# Patient Record
Sex: MALE | Race: WHITE | Employment: OTHER | URBAN - METROPOLITAN AREA
[De-identification: names, ages, dates, MRNs, and addresses within clinical notes are randomized per-mention and may not be internally consistent; named-entity substitution may affect disease eponyms.]

---

## 2018-10-01 ENCOUNTER — HOSPITAL ENCOUNTER (EMERGENCY)
Facility: CLINIC | Age: 83
Discharge: HOME | End: 2018-10-01
Attending: EMERGENCY MEDICINE | Admitting: EMERGENCY MEDICINE
Payer: MEDICARE

## 2018-10-01 VITALS
SYSTOLIC BLOOD PRESSURE: 148 MMHG | TEMPERATURE: 97.9 F | DIASTOLIC BLOOD PRESSURE: 72 MMHG | WEIGHT: 182 LBS | RESPIRATION RATE: 21 BRPM | OXYGEN SATURATION: 95 %

## 2018-10-01 DIAGNOSIS — K64.8 INTERNAL BLEEDING HEMORRHOIDS: ICD-10-CM

## 2018-10-01 LAB
ANION GAP SERPL CALCULATED.3IONS-SCNC: 9 MMOL/L (ref 3–14)
BASOPHILS # BLD AUTO: 0 10E9/L (ref 0–0.2)
BASOPHILS NFR BLD AUTO: 0.4 %
BUN SERPL-MCNC: 17 MG/DL (ref 7–30)
CALCIUM SERPL-MCNC: 8.7 MG/DL (ref 8.5–10.1)
CHLORIDE SERPL-SCNC: 107 MMOL/L (ref 94–109)
CO2 SERPL-SCNC: 23 MMOL/L (ref 20–32)
CREAT SERPL-MCNC: 1.09 MG/DL (ref 0.66–1.25)
DIFFERENTIAL METHOD BLD: NORMAL
EOSINOPHIL # BLD AUTO: 0.2 10E9/L (ref 0–0.7)
EOSINOPHIL NFR BLD AUTO: 2.1 %
ERYTHROCYTE [DISTWIDTH] IN BLOOD BY AUTOMATED COUNT: 13.5 % (ref 10–15)
GFR SERPL CREATININE-BSD FRML MDRD: 64 ML/MIN/1.7M2
GLUCOSE SERPL-MCNC: 95 MG/DL (ref 70–99)
HCT VFR BLD AUTO: 43.7 % (ref 40–53)
HGB BLD-MCNC: 14.7 G/DL (ref 13.3–17.7)
IMM GRANULOCYTES # BLD: 0 10E9/L (ref 0–0.4)
IMM GRANULOCYTES NFR BLD: 0.1 %
INTERPRETATION ECG - MUSE: NORMAL
LYMPHOCYTES # BLD AUTO: 2.3 10E9/L (ref 0.8–5.3)
LYMPHOCYTES NFR BLD AUTO: 31.3 %
MCH RBC QN AUTO: 30.4 PG (ref 26.5–33)
MCHC RBC AUTO-ENTMCNC: 33.6 G/DL (ref 31.5–36.5)
MCV RBC AUTO: 91 FL (ref 78–100)
MONOCYTES # BLD AUTO: 0.6 10E9/L (ref 0–1.3)
MONOCYTES NFR BLD AUTO: 8.1 %
NEUTROPHILS # BLD AUTO: 4.2 10E9/L (ref 1.6–8.3)
NEUTROPHILS NFR BLD AUTO: 58 %
NRBC # BLD AUTO: 0 10*3/UL
NRBC BLD AUTO-RTO: 0 /100
PLATELET # BLD AUTO: 150 10E9/L (ref 150–450)
POTASSIUM SERPL-SCNC: 4 MMOL/L (ref 3.4–5.3)
RBC # BLD AUTO: 4.83 10E12/L (ref 4.4–5.9)
SODIUM SERPL-SCNC: 139 MMOL/L (ref 133–144)
WBC # BLD AUTO: 7.2 10E9/L (ref 4–11)

## 2018-10-01 PROCEDURE — 80048 BASIC METABOLIC PNL TOTAL CA: CPT | Performed by: EMERGENCY MEDICINE

## 2018-10-01 PROCEDURE — 93005 ELECTROCARDIOGRAM TRACING: CPT

## 2018-10-01 PROCEDURE — 85025 COMPLETE CBC W/AUTO DIFF WBC: CPT | Performed by: EMERGENCY MEDICINE

## 2018-10-01 PROCEDURE — 99284 EMERGENCY DEPT VISIT MOD MDM: CPT

## 2018-10-01 ASSESSMENT — ENCOUNTER SYMPTOMS
FATIGUE: 0
BLOOD IN STOOL: 1
ABDOMINAL PAIN: 0
LIGHT-HEADEDNESS: 0
CONSTIPATION: 0
ANAL BLEEDING: 1
RECTAL PAIN: 0
DIARRHEA: 0

## 2018-10-01 NOTE — DISCHARGE INSTRUCTIONS
Hemorrhoids    Hemorrhoids are swollen and inflamed veins inside the rectum and near the anus. The rectum is the last several inches of the colon. The anus is the passage between the rectum and the outside of the body.  Causes  The veins can become swollen due to increased pressure in them. This is most often caused by:    Chronic constipation or diarrhea    Straining when having a bowel movement    Sitting too long on the toilet    A low-fiber diet    Pregnancy  Symptoms    Bleeding from the rectum (this may be noticeable after bowel movements)    Lump near the anus    Itching around the anus    Pain around the anus  There are different types of hemorrhoids. Depending on the type you have and the severity, you may be able to treat yourself at home. In some cases, a procedure may be the best treatment option. Your healthcare provider can tell you more about this, if needed.  Home care  General care    To get relief from pain or itching, try:  ? Medicines. Your healthcare provider may recommend stool softeners, suppositories, or laxatives to help manage constipation. Use these exactly as directed.  ? Sitz baths. A sitz bath involves sitting in a few inches of warm bath water. Be careful not to make the water so hot that you burn yourself--test it before sitting in it. Soak for about 10 to 15 minutes a few times a day. This may help relieve pain.  ? Topical products. Your healthcare provider may prescribe or recommend creams, ointments, or pads that can be applied to the hemorrhoid. Use these exactly as directed.  Tips to help prevent hemorrhoids    Eat more fiber. Fiber adds bulk to stool and absorbs water as it moves through your colon. This makes stool softer and easier to pass.  ? Increase the fiber in your diet with more fiber-rich foods. These include fresh fruit, vegetables, and whole grains.  ? Take a fiber supplement or bulking agent, if advised by your healthcare provider. These include products such as  psyllium or methylcellulose.    Drink more water. Your healthcare provider may direct you to drink plenty of water. This can help keep stool soft.    Be more active. Frequent exercise aids digestion and helps prevent constipation. It may also help make bowel movements more regular.    Don t strain during bowel movements. This can make hemorrhoids more likely. Also, don t sit on the toilet for long periods of time.  Follow-up care  Follow up with your healthcare provider as advised. If a culture or imaging tests were done, someone will let you know the results when they are ready. This may take a few days or longer. If your healthcare provider recommends a procedure for your hemorrhoids, these options can be discussed. Options may include surgery and outpatient office treatments.  When to seek medical advice  Call your healthcare provider right away if any of these occur:    Increased bleeding from the rectum    Increased pain around the rectum or anus    Weakness or dizziness  Call 911  Call 911 if any of these occur:    Trouble breathing or swallowing    Fainting or loss of consciousness    Unusually fast heart rate    Vomiting blood    Large amounts of blood in stool or black, tarry stools  Date Last Reviewed: 9/1/2017 2000-2017 [employer]. [address] [place] [address]. All rights reserved. This information is not intended as a substitute for professional medical care. Always follow your healthcare professional's instructions.          Treating Hemorrhoids: Self-Care    Follow your healthcare provider s advice about caring for your hemorrhoids at home. Some treatments help relieve symptoms right away. Others involve making changes in your diet and exercise habits. These can help ease constipation and prevent hemorrhoid symptoms from coming back.  Relieving symptoms  Your healthcare provider may prescribe anti-inflammatory medicine to help ease your symptoms. The following tips will  also help relieve pain and swelling.    Take sitz baths. Taking a sitz bath means sitting in a few inches of warm bath water. Soaking for 10 minutes twice a day can provide welcome relief from painful hemorrhoids. It can also help the area stay clean.    Develop good bowel habits. Use the bathroom when you need to. Don t ignore the urge to move your bowels. This can lead to constipation, hard stools, and straining. Also, don t read while on the toilet. Sit only as long as needed. Wipe gently with soft, unscented toilet tissue or baby wipes.    Use ice packs. Placing an ice pack on a thrombosed external hemorrhoid can help relieve pain right away. It will also help reduce the blood clot. Use the ice for 15 to 20 minutes at a time. Keep a cloth between the ice and your skin to prevent skin damage.    Use other measures. Laxatives and enemas can help ease constipation. But use them only on your healthcare provider s advice. For symptom relief, try using cotton pads soaked in witch hazel. These are available at most drugstores. Over-the-counter hemorrhoid ointments and petroleum jelly can also provide relief.  Add fiber to your diet  Adding fiber to your diet can help relieve constipation by making stools softer and easier to pass. To increase your fiber intake, your healthcare provider may recommend a bulking agent, such as psyllium. This is a high-fiber supplement available at most grocery stores and drugstores. Eating more fiber-rich foods will also help. There are two types of fiber:    Insoluble fiber is the main ingredient in bulking agents. It s also found in foods such as wheat bran, whole-grain breads, fresh fruits, and vegetables.    Soluble fiber is found in foods such as oat bran. Although soluble fiber is good for you, it may not ease constipation as much as foods high in insoluble fiber.  Drink more water  Along with a high-fiber diet, drinking more water can help ease constipation. This is because  insoluble fiber absorbs water, making stools soft and bulky. Be sure to drink plenty of water throughout the day. Drinking fruit juices, such as prune juice or apple juice, can also help prevent constipation.  Get more exercise  Regular exercise aids digestion and helps prevent constipation. It s also great for your health. So talk with your healthcare provider about starting an exercise program. Low-impact activities, such as swimming or walking, are good places to start. Take it easy at first. And remember to drink plenty of water when you exercise.  High-fiber foods  High-fiber foods offer many benefits. By making your stools softer, they help heal and prevent swollen hemorrhoids. They may also help reduce the risk of colon and rectal cancer. Best of all, they re usually low in calories and taste great. Here are some examples of fiber-rich foods.    Whole grains, such as wheat bran, corn bran, and brown rice.    Vegetables, especially carrots, broccoli, cabbage, and peas.    Fruits, such as apples, bananas, raisins, peaches, and pears.    Nuts and legumes, especially peanuts, lentils, and kidney beans.  Easy ways to add fiber  The tips below offer some simple ways to add more high-fiber foods to your meals.    Start your day with a high-fiber breakfast. Eat a wheat bran cereal along with a sliced banana. Or, try peanut butter on whole-wheat toast.    Eat carrot sticks for snacks. They re easy to prepare, taste great, and are low in calories.    Use whole-grain breads instead of white bread for sandwiches.    Eat fruits for treats. Try an apple and some raisins instead of a candy bar.   Date Last Reviewed: 7/1/2016 2000-2017 [employer]. [address] [place] [address], [place] [address]. All rights reserved. This information is not intended as a substitute for professional medical care. Always follow your healthcare professional's instructions.          Treating Hemorrhoids: Surgery    Your healthcare  provider may recommend surgery to remove hemorrhoids that cause severe symptoms. Your healthcare provider can explain the procedure that will be used. You ll also be told how to get ready for surgery, and what to expect while you recover.  Getting ready for surgery  Your surgery will be done at a hospital or outpatient surgical center. Be sure to follow all your healthcare provider s guidelines to prepare for surgery.    Tell your healthcare provider what medicines you take. This includes blood thinners, aspirin, and ibuprofen. Also mention if you take any herbal remedies or supplements. In some cases, you may need to stop taking them a week before surgery.    Stop smoking.    Arrange for an adult family member or friend to give you a ride home after the procedure.    Stop eating and drinking before midnight, the night before your surgery.        Risks and complications  The possible risks and complications of the treatments described on these pages include:    Infection    Bleeding    Trouble urinating    Narrowing of the anal canal (very rare)  When to call your healthcare provider  After surgery, call your healthcare provider immediately if you have any of the following:    Increasing pain    Persistent bleeding    Fever or chills    Inability to move your bowels    Trouble urinating   The day of surgery  Arrive at the hospital or surgery center on time. You will be asked to sign some forms and change into a patient gown. You ll then be given an IV (intravenous line), which supplies fluids and medicine. You may also be given a laxative or enema to clean stool from your rectum. Just before surgery, you ll talk with an anesthesiologist. He or she can explain the type of medicine used to prevent pain during surgery.    Local anesthesia numbs just the surgical area.    Monitored sedation makes you relaxed and drowsy.    Regional anesthesia numbs certain areas of your body.    General anesthesia lets you sleep during  the procedure.  During surgery  Your healthcare provider will insert an anoscope to view the anal canal. Using surgical tools, the swollen hemorrhoids are then removed. In some cases, the incision is closed with sutures. In other cases, you may have a procedure that closes the incision with staples.  Hemorrhoidectomy with sutures  The hemorrhoids are removed using surgical tools, such as a scalpel or cautery (sealing) device. The incision is then closed with sutures. In some cases, the incision may be left partially open. This allows fluid to drain and helps the healing process.  Stapled hemorrhoidopexy  This procedure uses a special device to remove a ring of tissue from the anal canal. Removing the tissue cuts off blood supply to the hemorrhoids, causing them to shrink. The tissue ring is then secured with staples. This helps hold the tissue in place.  After surgery  You ll be taken to a recovery area to rest for a while. You can usually go home the same day. But in some cases you may need to remain in the hospital overnight. For a short time after surgery you may have nausea, minor bleeding, and discharge. You ll also likely have some pain. To help you feel better, your healthcare provider will prescribe pain medicine. You may also be prescribed medicines to help make bowel movements easier.  Date Last Reviewed: 7/1/2016 2000-2017 [employer]. [address] [place] [address], [place] [address]. All rights reserved. This information is not intended as a substitute for professional medical care. Always follow your healthcare professional's instructions.

## 2018-10-01 NOTE — ED AVS SNAPSHOT
[facility]    [address]    [facility] [address]    Phone:  [phone]    Fax:  [phone]                                       Volodymyr Sanchez   MRN: [id number]    Department:  [facility]   Date of Visit:  10/1/2018           After Visit Summary Signature Page     I have received my discharge instructions, and my questions have been answered. I have discussed any challenges I see with this plan with the nurse or doctor.    ..........................................................................................................................................  Patient/Patient Representative Signature      ..........................................................................................................................................  Patient Representative Print Name and Relationship to Patient    ..................................................               ................................................  Date                                   Time    ..........................................................................................................................................  Reviewed by Signature/Title    ...................................................              ..............................................  Date                                               Time          22EPIC Rev 08/18

## 2018-10-01 NOTE — ED AVS SNAPSHOT
[facility]    [address] [place] [address]    Phone:  [phone]    Fax:  [phone]                                       Volodymyr Sanchez   MRN: [id number]    Department:  [facility]   Date of Visit:  10/1/2018           Patient Information     Date Of Birth          11/13/1931        Your diagnoses for this visit were:     Internal bleeding hemorrhoids        You were seen by Myles Osborn MD.      Follow-up Information     Follow up with Colorectal surgery.    Why:  for re-evaluation of your symptoms        Discharge Instructions         Hemorrhoids    Hemorrhoids are swollen and inflamed veins inside the rectum and near the anus. The rectum is the last several inches of the colon. The anus is the passage between the rectum and the outside of the body.  Causes  The veins can become swollen due to increased pressure in them. This is most often caused by:    Chronic constipation or diarrhea    Straining when having a bowel movement    Sitting too long on the toilet    A low-fiber diet    Pregnancy  Symptoms    Bleeding from the rectum (this may be noticeable after bowel movements)    Lump near the anus    Itching around the anus    Pain around the anus  There are different types of hemorrhoids. Depending on the type you have and the severity, you may be able to treat yourself at home. In some cases, a procedure may be the best treatment option. Your healthcare provider can tell you more about this, if needed.  Home care  General care    To get relief from pain or itching, try:  ? Medicines. Your healthcare provider may recommend stool softeners, suppositories, or laxatives to help manage constipation. Use these exactly as directed.  ? Sitz baths. A sitz bath involves sitting in a few inches of warm bath water. Be careful not to make the water so hot that you burn yourself--test it before sitting in it. Soak for  about 10 to 15 minutes a few times a day. This may help relieve pain.  ? Topical products. Your healthcare provider may prescribe or recommend creams, ointments, or pads that can be applied to the hemorrhoid. Use these exactly as directed.  Tips to help prevent hemorrhoids    Eat more fiber. Fiber adds bulk to stool and absorbs water as it moves through your colon. This makes stool softer and easier to pass.  ? Increase the fiber in your diet with more fiber-rich foods. These include fresh fruit, vegetables, and whole grains.  ? Take a fiber supplement or bulking agent, if advised by your healthcare provider. These include products such as psyllium or methylcellulose.    Drink more water. Your healthcare provider may direct you to drink plenty of water. This can help keep stool soft.    Be more active. Frequent exercise aids digestion and helps prevent constipation. It may also help make bowel movements more regular.    Don t strain during bowel movements. This can make hemorrhoids more likely. Also, don t sit on the toilet for long periods of time.  Follow-up care  Follow up with your healthcare provider as advised. If a culture or imaging tests were done, someone will let you know the results when they are ready. This may take a few days or longer. If your healthcare provider recommends a procedure for your hemorrhoids, these options can be discussed. Options may include surgery and outpatient office treatments.  When to seek medical advice  Call your healthcare provider right away if any of these occur:    Increased bleeding from the rectum    Increased pain around the rectum or anus    Weakness or dizziness  Call 911  Call 911 if any of these occur:    Trouble breathing or swallowing    Fainting or loss of consciousness    Unusually fast heart rate    Vomiting blood    Large amounts of blood in stool or black, tarry stools  Date Last Reviewed: 9/1/2017 2000-2017 [employer]. [address], [place] [address]. All rights reserved. This information is not intended as a substitute for professional medical care. Always follow your healthcare professional's instructions.          Treating Hemorrhoids: Self-Care    Follow your healthcare provider s advice about caring for your hemorrhoids at home. Some treatments help relieve symptoms right away. Others involve making changes in your diet and exercise habits. These can help ease constipation and prevent hemorrhoid symptoms from coming back.  Relieving symptoms  Your healthcare provider may prescribe anti-inflammatory medicine to help ease your symptoms. The following tips will also help relieve pain and swelling.    Take sitz baths. Taking a sitz bath means sitting in a few inches of warm bath water. Soaking for 10 minutes twice a day can provide welcome relief from painful hemorrhoids. It can also help the area stay clean.    Develop good bowel habits. Use the bathroom when you need to. Don t ignore the urge to move your bowels. This can lead to constipation, hard stools, and straining. Also, don t read while on the toilet. Sit only as long as needed. Wipe gently with soft, unscented toilet tissue or baby wipes.    Use ice packs. Placing an ice pack on a thrombosed external hemorrhoid can help relieve pain right away. It will also help reduce the blood clot. Use the ice for 15 to 20 minutes at a time. Keep a cloth between the ice and your skin to prevent skin damage.    Use other measures. Laxatives and enemas can help ease constipation. But use them only on your healthcare provider s advice. For symptom relief, try using cotton pads soaked in witch hazel. These are available at most drugstores. Over-the-counter hemorrhoid ointments and petroleum jelly can also provide relief.  Add fiber to your diet  Adding fiber to your diet can help relieve constipation by making stools softer and easier to pass. To increase your fiber intake, your healthcare  provider may recommend a bulking agent, such as psyllium. This is a high-fiber supplement available at most grocery stores and drugstores. Eating more fiber-rich foods will also help. There are two types of fiber:    Insoluble fiber is the main ingredient in bulking agents. It s also found in foods such as wheat bran, whole-grain breads, fresh fruits, and vegetables.    Soluble fiber is found in foods such as oat bran. Although soluble fiber is good for you, it may not ease constipation as much as foods high in insoluble fiber.  Drink more water  Along with a high-fiber diet, drinking more water can help ease constipation. This is because insoluble fiber absorbs water, making stools soft and bulky. Be sure to drink plenty of water throughout the day. Drinking fruit juices, such as prune juice or apple juice, can also help prevent constipation.  Get more exercise  Regular exercise aids digestion and helps prevent constipation. It s also great for your health. So talk with your healthcare provider about starting an exercise program. Low-impact activities, such as swimming or walking, are good places to start. Take it easy at first. And remember to drink plenty of water when you exercise.  High-fiber foods  High-fiber foods offer many benefits. By making your stools softer, they help heal and prevent swollen hemorrhoids. They may also help reduce the risk of colon and rectal cancer. Best of all, they re usually low in calories and taste great. Here are some examples of fiber-rich foods.    Whole grains, such as wheat bran, corn bran, and brown rice.    Vegetables, especially carrots, broccoli, cabbage, and peas.    Fruits, such as apples, bananas, raisins, peaches, and pears.    Nuts and legumes, especially peanuts, lentils, and kidney beans.  Easy ways to add fiber  The tips below offer some simple ways to add more high-fiber foods to your meals.    Start your day with a high-fiber breakfast. Eat a wheat bran cereal  along with a sliced banana. Or, try peanut butter on whole-wheat toast.    Eat carrot sticks for snacks. They re easy to prepare, taste great, and are low in calories.    Use whole-grain breads instead of white bread for sandwiches.    Eat fruits for treats. Try an apple and some raisins instead of a candy bar.   Date Last Reviewed: 7/1/2016 2000-2017 [employer]. [address] [place] [address], [place] [address]. All rights reserved. This information is not intended as a substitute for professional medical care. Always follow your healthcare professional's instructions.          Treating Hemorrhoids: Surgery    Your healthcare provider may recommend surgery to remove hemorrhoids that cause severe symptoms. Your healthcare provider can explain the procedure that will be used. You ll also be told how to get ready for surgery, and what to expect while you recover.  Getting ready for surgery  Your surgery will be done at a hospital or outpatient surgical center. Be sure to follow all your healthcare provider s guidelines to prepare for surgery.    Tell your healthcare provider what medicines you take. This includes blood thinners, aspirin, and ibuprofen. Also mention if you take any herbal remedies or supplements. In some cases, you may need to stop taking them a week before surgery.    Stop smoking.    Arrange for an adult family member or friend to give you a ride home after the procedure.    Stop eating and drinking before midnight, the night before your surgery.        Risks and complications  The possible risks and complications of the treatments described on these pages include:    Infection    Bleeding    Trouble urinating    Narrowing of the anal canal (very rare)  When to call your healthcare provider  After surgery, call your healthcare provider immediately if you have any of the following:    Increasing pain    Persistent bleeding    Fever or chills    Inability to move your bowels    Trouble  urinating   The day of surgery  Arrive at the hospital or surgery center on time. You will be asked to sign some forms and change into a patient gown. You ll then be given an IV (intravenous line), which supplies fluids and medicine. You may also be given a laxative or enema to clean stool from your rectum. Just before surgery, you ll talk with an anesthesiologist. He or she can explain the type of medicine used to prevent pain during surgery.    Local anesthesia numbs just the surgical area.    Monitored sedation makes you relaxed and drowsy.    Regional anesthesia numbs certain areas of your body.    General anesthesia lets you sleep during the procedure.  During surgery  Your healthcare provider will insert an anoscope to view the anal canal. Using surgical tools, the swollen hemorrhoids are then removed. In some cases, the incision is closed with sutures. In other cases, you may have a procedure that closes the incision with staples.  Hemorrhoidectomy with sutures  The hemorrhoids are removed using surgical tools, such as a scalpel or cautery (sealing) device. The incision is then closed with sutures. In some cases, the incision may be left partially open. This allows fluid to drain and helps the healing process.  Stapled hemorrhoidopexy  This procedure uses a special device to remove a ring of tissue from the anal canal. Removing the tissue cuts off blood supply to the hemorrhoids, causing them to shrink. The tissue ring is then secured with staples. This helps hold the tissue in place.  After surgery  You ll be taken to a recovery area to rest for a while. You can usually go home the same day. But in some cases you may need to remain in the hospital overnight. For a short time after surgery you may have nausea, minor bleeding, and discharge. You ll also likely have some pain. To help you feel better, your healthcare provider will prescribe pain medicine. You may also be prescribed medicines to help make bowel  movements easier.  Date Last Reviewed: 7/1/2016 2000-2017 [employer]. [address] [place] [address], [place] [address]. All rights reserved. This information is not intended as a substitute for professional medical care. Always follow your healthcare professional's instructions.          24 Hour Appointment Hotline       To make an appointment at any [facility], call [phone] ([phone]). If you don't have a family doctor or clinic, we will help you find one. [facility] are conveniently located to serve the needs of you and your family.             Review of your medicines      Our records show that you are taking the medicines listed below. If these are incorrect, please call your family doctor or clinic.        Dose / Directions Last dose taken    LOVASTATIN PO        Refills:  0                Procedures and tests performed during your visit     Basic metabolic panel    CBC with platelets differential    EKG 12 lead    Peripheral IV: Standard      Orders Needing Specimen Collection     None      Pending Results     Date and Time Order Name Status Description    10/1/2018 0737 EKG 12 lead Preliminary             Pending Culture Results     No orders found from 9/29/2018 to 10/2/2018.            Pending Results Instructions     If you had any lab results that were not finalized at the time of your Discharge, you can call the ED Lab Result RN at [phone]. You will be contacted by this team for any positive Lab results or changes in treatment. The nurses are available 7 days a week from 10A to 6:30P.  You can leave a message 24 hours per day and they will return your call.        Test Results From Your Hospital Stay        10/1/2018  7:48 AM      Component Results     Component Value Ref Range & Units Status    WBC 7.2 4.0 - 11.0 10e9/L Final    RBC Count 4.83 4.4 - 5.9 10e12/L Final    Hemoglobin 14.7 13.3 - 17.7 g/dL Final    Hematocrit 43.7 40.0 - 53.0 % Final    MCV 91 78 -  100 fl Final    MCH 30.4 26.5 - 33.0 pg Final    MCHC 33.6 31.5 - 36.5 g/dL Final    RDW 13.5 10.0 - 15.0 % Final    Platelet Count 150 150 - 450 10e9/L Final    Diff Method Automated Method  Final    % Neutrophils 58.0 % Final    % Lymphocytes 31.3 % Final    % Monocytes 8.1 % Final    % Eosinophils 2.1 % Final    % Basophils 0.4 % Final    % Immature Granulocytes 0.1 % Final    Nucleated RBCs 0 0 /100 Final    Absolute Neutrophil 4.2 1.6 - 8.3 10e9/L Final    Absolute Lymphocytes 2.3 0.8 - 5.3 10e9/L Final    Absolute Monocytes 0.6 0.0 - 1.3 10e9/L Final    Absolute Eosinophils 0.2 0.0 - 0.7 10e9/L Final    Absolute Basophils 0.0 0.0 - 0.2 10e9/L Final    Abs Immature Granulocytes 0.0 0 - 0.4 10e9/L Final    Absolute Nucleated RBC 0.0  Final         10/1/2018  7:56 AM      Component Results     Component Value Ref Range & Units Status    Sodium 139 133 - 144 mmol/L Final    Potassium 4.0 3.4 - 5.3 mmol/L Final    Chloride 107 94 - 109 mmol/L Final    Carbon Dioxide 23 20 - 32 mmol/L Final    Anion Gap 9 3 - 14 mmol/L Final    Glucose 95 70 - 99 mg/dL Final    Urea Nitrogen 17 7 - 30 mg/dL Final    Creatinine 1.09 0.66 - 1.25 mg/dL Final    GFR Estimate 64 >60 mL/min/1.7m2 Final    Non  GFR Calc    GFR Estimate If Black 77 >60 mL/min/1.7m2 Final    African American GFR Calc    Calcium 8.7 8.5 - 10.1 mg/dL Final                Clinical Quality Measure: Blood Pressure Screening     Your blood pressure was checked while you were in the emergency department today. The last reading we obtained was  BP: 148/72 . Please read the guidelines below about what these numbers mean and what you should do about them.  If your systolic blood pressure (the top number) is less than 120 and your diastolic blood pressure (the bottom number) is less than 80, then your blood pressure is normal. There is nothing more that you need to do about it.  If your systolic blood pressure (the top number) is 120-139 or your  "diastolic blood pressure (the bottom number) is 80-89, your blood pressure may be higher than it should be. You should have your blood pressure rechecked within a year by a primary care provider.  If your systolic blood pressure (the top number) is 140 or greater or your diastolic blood pressure (the bottom number) is 90 or greater, you may have high blood pressure. High blood pressure is treatable, but if left untreated over time it can put you at risk for heart attack, stroke, or kidney failure. You should have your blood pressure rechecked by a primary care provider within the next 4 weeks.  If your provider in the emergency department today gave you specific instructions to follow-up with your doctor or provider even sooner than that, you should follow that instruction and not wait for up to 4 weeks for your follow-up visit.        Thank you for choosing [facility]       Thank you for choosing [facility] for your care. Our goal is always to provide you with excellent care. Hearing back from our patients is one way we can continue to improve our services. Please take a few minutes to complete the written survey that you may receive in the mail after you visit with us. Thank you!        [employer] Information     [facility] lets you send messages to your doctor, view your test results, renew your prescriptions, schedule appointments and more. To sign up, go to [email or web address] . Click on \"Log in\" on the left side of the screen, which will take you to the Welcome page. Then click on \"Sign up Now\" on the right side of the page.     You will be asked to enter the access code listed below, as well as some personal information. Please follow the directions to create your username and password.     Your access code is: [email or web address]  Expires: 2018  9:14 AM     Your access code will  in 90 days. If you need help or a new code, please call your [facility] clinic or [phone].        Care EveryWhere ID     This is " your Care EveryWhere ID. This could be used by other organizations to access your [facility] medical records  [id number]        Equal Access to Services     EDDIE CONTRERAS : Hadii flakito Cross, carline aguila, sharon vivar, gaurav barfield. So [facility] [phone].    ATENCIÓN: Si habla español, tiene a robledo disposición servicios gratuitos de asistencia lingüística. Llame al [phone].    We comply with applicable federal civil rights laws and [place] laws. We do not discriminate on the basis of race, color, national origin, age, disability, sex, sexual orientation, or gender identity.            After Visit Summary       This is your record. Keep this with you and show to your community pharmacist(s) and doctor(s) at your next visit.

## 2018-10-01 NOTE — ED TRIAGE NOTES
Patient reports he has issues with constipation and has hemorrhoids. Patient states he is having rectal bleeding from them which started yesterday. Is using a pad to catch the blood.

## 2018-10-01 NOTE — ED PROVIDER NOTES
History     Chief Complaint:  Rectal bleeding    HPI   Volodymyr Sanchez is a 86 year old male who presents to the emergency department today with rectal bleeding. The patient describes bright red, heavy bleeding from his hemorrhoids intermittently. The bleeding started yesterday, but he is worried that he lost a lot of blood today. He reports that usually he puts on a pad and it resolves after a few days, but today felt more serious. He denies use of anticoagulants or aspirin. He reports recent chronic constipation. He denies other abdominal pain, dizziness. Patient had a colonoscopy 9 years ago, which was not significant at that point.     Patient's PCP is Henrique Lockhart at [facility].     Allergies:  No Known Drug Allergies     Medications:    Lovastatin     Past Medical History:    Hemorrhoids  Hypercholesteremia     Past Surgical History:    History reviewed. No pertinent past surgical history.    Family History:    History reviewed. No pertinent family history.     Social History:  The patient was alone.  Smoking Status: Never  Smokeless Tobacco: Never  Alcohol Use: No    Marital Status:       Review of Systems   Constitutional: Negative for fatigue.   Gastrointestinal: Positive for anal bleeding and blood in stool. Negative for abdominal pain, constipation, diarrhea and rectal pain.   Neurological: Negative for light-headedness.   All other systems reviewed and are negative.    Physical Exam     Patient Vitals for the past 24 hrs:   BP Temp Temp src Heart Rate Resp SpO2 Weight   10/01/18 0830 - - - 68 21 95 % -   10/01/18 0718 148/72 97.9  F (36.6  C) Oral 86 16 96 % 82.6 kg (182 lb)      Physical Exam  General: Patient is alert and cooperative.  HENT:  Normal nose, oropharynx. Moist oral mucosa.  Eyes: EOMI. Normal conjunctiva.  Neck:  Normal range of motion and appearance.   Cardiovascular:  Normal rate.   Pulmonary/Chest:  Effort normal.  Abdominal: Soft. No distension or tenderness.   Prolapsed, reducible, non bleeding internal hemorrhoid.   Musculoskeletal: Normal range of motion.  Neurological: oriented, normal strength, sensation, and coordination.   Skin: Warm and dry. No rash or bruising.   Psychiatric: Normal mood and affect. Normal behavior and judgement.    Emergency Department Course   ECG:  Indication: Bleeding  Completed at 0714.  Read at 0715.   Normal sinus rhythm  Normal ECG    Rate 89 bpm. MI interval 162. QRS duration 96. QT/QTc 354/430. P-R-T axes 24 -28 28.     Laboratory:  Laboratory findings were communicated with the patient who voiced understanding of the findings.  CBC: AWNL (WBC 7.2, HGB 14/7, )  BMP: AWNL (Creatinine 1.09)     Emergency Department Course:  Nursing notes and vitals reviewed.  0708: I performed an exam of the patient as documented above.   IV was inserted and blood was drawn for laboratory testing, results above.  0902:Findings and plan explained to the Patient. Patient discharged home with instructions regarding supportive care, medications, and reasons to return. The importance of close follow-up was reviewed.   I personally reviewed the laboratory results with the Patient and answered all related questions prior to discharge.   Impression & Plan    Medical Decision Making:  This is an 86-year-old male who was presented with rectal bleeding secondary to a prolapsed, reducible internal hemorrhoid.  He is hemodynamically stable with no associated abdominal pain or rectal discomfort.  He is not anticoagulated and in fact does not even take a daily aspirin.  Laboratory tests include a reassuringly normal CBC.  His physical examination was notable for a grade 3 internal hemorrhoid with evidence of recent but not current bleeding.  There is no indication for imaging.  I have recommended follow-up with colorectal surgery for definitive management.  This has in fact been recommended and arranged in the past but he canceled appointment due to seeming  resolution of his problem.  He intends to contact his primary care provider for assistance with arranging follow-up with colorectal and is comfortable going home.  He can return at any point in time if his bleeding worsens or he develops any associated symptoms of concern such as weakness or lightheadedness.    Diagnosis:    ICD-10-CM    1. Internal bleeding hemorrhoids K64.8        Disposition:  discharged to home    Scribe Disclosure:  I, Trini Mchugh, am serving as a scribe at 7:08 AM on 10/1/2018 to document services personally performed by Myles Osborn MD based on my observations and the provider's statements to me.    10/1/2018   [facility] EMERGENCY DEPARTMENT       Myles Osborn MD  10/01/18 1027

## 2019-04-04 ENCOUNTER — HOSPITAL ENCOUNTER (INPATIENT)
Facility: CLINIC | Age: 84
LOS: 1 days | Discharge: HOME | DRG: 101 | End: 2019-04-05
Attending: EMERGENCY MEDICINE | Admitting: INTERNAL MEDICINE
Payer: COMMERCIAL

## 2019-04-04 ENCOUNTER — APPOINTMENT (OUTPATIENT)
Dept: CT IMAGING | Facility: CLINIC | Age: 84
DRG: 101 | End: 2019-04-04
Attending: EMERGENCY MEDICINE
Payer: COMMERCIAL

## 2019-04-04 ENCOUNTER — APPOINTMENT (OUTPATIENT)
Dept: SPEECH THERAPY | Facility: CLINIC | Age: 84
DRG: 101 | End: 2019-04-04
Attending: INTERNAL MEDICINE
Payer: COMMERCIAL

## 2019-04-04 ENCOUNTER — APPOINTMENT (OUTPATIENT)
Dept: MRI IMAGING | Facility: CLINIC | Age: 84
DRG: 101 | End: 2019-04-04
Attending: INTERNAL MEDICINE
Payer: COMMERCIAL

## 2019-04-04 ENCOUNTER — APPOINTMENT (OUTPATIENT)
Dept: GENERAL RADIOLOGY | Facility: CLINIC | Age: 84
DRG: 101 | End: 2019-04-04
Attending: INTERNAL MEDICINE
Payer: COMMERCIAL

## 2019-04-04 DIAGNOSIS — R56.9 SEIZURES (H): ICD-10-CM

## 2019-04-04 DIAGNOSIS — K92.2 UGIB (UPPER GASTROINTESTINAL BLEED): ICD-10-CM

## 2019-04-04 LAB
ABO + RH BLD: NORMAL
ABO + RH BLD: NORMAL
ALBUMIN SERPL-MCNC: 3.5 G/DL (ref 3.4–5)
ALBUMIN UR-MCNC: 20 MG/DL
ALP SERPL-CCNC: 66 U/L (ref 40–150)
ALT SERPL W P-5'-P-CCNC: 19 U/L (ref 0–70)
AMORPH CRY #/AREA URNS HPF: ABNORMAL /HPF
ANION GAP SERPL CALCULATED.3IONS-SCNC: 4 MMOL/L (ref 3–14)
APPEARANCE UR: CLEAR
AST SERPL W P-5'-P-CCNC: 42 U/L (ref 0–45)
BACTERIA #/AREA URNS HPF: ABNORMAL /HPF
BASOPHILS # BLD AUTO: 0 10E9/L (ref 0–0.2)
BASOPHILS NFR BLD AUTO: 0.2 %
BILIRUB SERPL-MCNC: 0.7 MG/DL (ref 0.2–1.3)
BILIRUB UR QL STRIP: NEGATIVE
BLD GP AB SCN SERPL QL: NORMAL
BLOOD BANK CMNT PATIENT-IMP: NORMAL
BUN SERPL-MCNC: 17 MG/DL (ref 7–30)
CALCIUM SERPL-MCNC: 8.5 MG/DL (ref 8.5–10.1)
CHLORIDE SERPL-SCNC: 108 MMOL/L (ref 94–109)
CO2 SERPL-SCNC: 26 MMOL/L (ref 20–32)
COLOR UR AUTO: YELLOW
CREAT SERPL-MCNC: 0.95 MG/DL (ref 0.66–1.25)
DIFFERENTIAL METHOD BLD: ABNORMAL
EOSINOPHIL # BLD AUTO: 0 10E9/L (ref 0–0.7)
EOSINOPHIL NFR BLD AUTO: 0.1 %
ERYTHROCYTE [DISTWIDTH] IN BLOOD BY AUTOMATED COUNT: 13.6 % (ref 10–15)
ETHANOL SERPL-MCNC: <0.01 G/DL
GASTROCULT GAST QL: POSITIVE
GFR SERPL CREATININE-BSD FRML MDRD: 72 ML/MIN/{1.73_M2}
GLUCOSE SERPL-MCNC: 135 MG/DL (ref 70–99)
GLUCOSE UR STRIP-MCNC: 70 MG/DL
HCT VFR BLD AUTO: 40.3 % (ref 40–53)
HGB BLD-MCNC: 13.1 G/DL (ref 13.3–17.7)
HGB UR QL STRIP: NEGATIVE
IMM GRANULOCYTES # BLD: 0.1 10E9/L (ref 0–0.4)
IMM GRANULOCYTES NFR BLD: 0.5 %
INTERPRETATION ECG - MUSE: NORMAL
KETONES UR STRIP-MCNC: ABNORMAL MG/DL
LEUKOCYTE ESTERASE UR QL STRIP: NEGATIVE
LYMPHOCYTES # BLD AUTO: 0.7 10E9/L (ref 0.8–5.3)
LYMPHOCYTES NFR BLD AUTO: 6.1 %
MCH RBC QN AUTO: 29.8 PG (ref 26.5–33)
MCHC RBC AUTO-ENTMCNC: 32.5 G/DL (ref 31.5–36.5)
MCV RBC AUTO: 92 FL (ref 78–100)
MONOCYTES # BLD AUTO: 0.6 10E9/L (ref 0–1.3)
MONOCYTES NFR BLD AUTO: 5.3 %
MUCOUS THREADS #/AREA URNS LPF: PRESENT /LPF
NEUTROPHILS # BLD AUTO: 10.2 10E9/L (ref 1.6–8.3)
NEUTROPHILS NFR BLD AUTO: 87.8 %
NITRATE UR QL: NEGATIVE
NRBC # BLD AUTO: 0 10*3/UL
NRBC BLD AUTO-RTO: 0 /100
PH GAST: 4 PH
PH UR STRIP: 5.5 PH (ref 5–7)
PLATELET # BLD AUTO: 144 10E9/L (ref 150–450)
POTASSIUM SERPL-SCNC: 4.1 MMOL/L (ref 3.4–5.3)
PROCALCITONIN SERPL-MCNC: <0.05 NG/ML
PROT SERPL-MCNC: 6.6 G/DL (ref 6.8–8.8)
RBC # BLD AUTO: 4.39 10E12/L (ref 4.4–5.9)
RBC #/AREA URNS AUTO: 2 /HPF (ref 0–2)
SODIUM SERPL-SCNC: 138 MMOL/L (ref 133–144)
SOURCE: ABNORMAL
SP GR UR STRIP: 1.03 (ref 1–1.03)
SPECIMEN EXP DATE BLD: NORMAL
SQUAMOUS #/AREA URNS AUTO: <1 /HPF (ref 0–1)
TROPONIN I SERPL-MCNC: 0.02 UG/L (ref 0–0.04)
UROBILINOGEN UR STRIP-MCNC: NORMAL MG/DL (ref 0–2)
WBC # BLD AUTO: 11.6 10E9/L (ref 4–11)
WBC #/AREA URNS AUTO: 1 /HPF (ref 0–5)

## 2019-04-04 PROCEDURE — 85025 COMPLETE CBC W/AUTO DIFF WBC: CPT | Performed by: EMERGENCY MEDICINE

## 2019-04-04 PROCEDURE — 25000132 ZZH RX MED GY IP 250 OP 250 PS 637: Performed by: INTERNAL MEDICINE

## 2019-04-04 PROCEDURE — 86900 BLOOD TYPING SEROLOGIC ABO: CPT | Performed by: EMERGENCY MEDICINE

## 2019-04-04 PROCEDURE — 95816 EEG AWAKE AND DROWSY: CPT

## 2019-04-04 PROCEDURE — 84484 ASSAY OF TROPONIN QUANT: CPT | Performed by: EMERGENCY MEDICINE

## 2019-04-04 PROCEDURE — 86850 RBC ANTIBODY SCREEN: CPT | Performed by: EMERGENCY MEDICINE

## 2019-04-04 PROCEDURE — 81001 URINALYSIS AUTO W/SCOPE: CPT | Performed by: EMERGENCY MEDICINE

## 2019-04-04 PROCEDURE — 25000128 H RX IP 250 OP 636: Performed by: EMERGENCY MEDICINE

## 2019-04-04 PROCEDURE — 25000128 H RX IP 250 OP 636: Performed by: INTERNAL MEDICINE

## 2019-04-04 PROCEDURE — 25500064 ZZH RX 255 OP 636: Performed by: INTERNAL MEDICINE

## 2019-04-04 PROCEDURE — 99285 EMERGENCY DEPT VISIT HI MDM: CPT | Mod: 25

## 2019-04-04 PROCEDURE — 80320 DRUG SCREEN QUANTALCOHOLS: CPT | Performed by: EMERGENCY MEDICINE

## 2019-04-04 PROCEDURE — 70450 CT HEAD/BRAIN W/O DYE: CPT

## 2019-04-04 PROCEDURE — 71045 X-RAY EXAM CHEST 1 VIEW: CPT

## 2019-04-04 PROCEDURE — 36415 COLL VENOUS BLD VENIPUNCTURE: CPT | Performed by: INTERNAL MEDICINE

## 2019-04-04 PROCEDURE — 86901 BLOOD TYPING SEROLOGIC RH(D): CPT | Performed by: EMERGENCY MEDICINE

## 2019-04-04 PROCEDURE — 96361 HYDRATE IV INFUSION ADD-ON: CPT

## 2019-04-04 PROCEDURE — C9113 INJ PANTOPRAZOLE SODIUM, VIA: HCPCS | Performed by: EMERGENCY MEDICINE

## 2019-04-04 PROCEDURE — 96374 THER/PROPH/DIAG INJ IV PUSH: CPT

## 2019-04-04 PROCEDURE — 12000000 ZZH R&B MED SURG/OB

## 2019-04-04 PROCEDURE — 82271 OCCULT BLOOD OTHER SOURCES: CPT | Performed by: EMERGENCY MEDICINE

## 2019-04-04 PROCEDURE — 80053 COMPREHEN METABOLIC PANEL: CPT | Performed by: EMERGENCY MEDICINE

## 2019-04-04 PROCEDURE — 36415 COLL VENOUS BLD VENIPUNCTURE: CPT | Performed by: EMERGENCY MEDICINE

## 2019-04-04 PROCEDURE — 84145 PROCALCITONIN (PCT): CPT | Performed by: INTERNAL MEDICINE

## 2019-04-04 PROCEDURE — 93005 ELECTROCARDIOGRAM TRACING: CPT

## 2019-04-04 PROCEDURE — 99223 1ST HOSP IP/OBS HIGH 75: CPT | Mod: AI | Performed by: INTERNAL MEDICINE

## 2019-04-04 PROCEDURE — 25800030 ZZH RX IP 258 OP 636: Performed by: INTERNAL MEDICINE

## 2019-04-04 PROCEDURE — A9585 GADOBUTROL INJECTION: HCPCS | Performed by: INTERNAL MEDICINE

## 2019-04-04 PROCEDURE — 92610 EVALUATE SWALLOWING FUNCTION: CPT | Mod: GN

## 2019-04-04 PROCEDURE — 70553 MRI BRAIN STEM W/O & W/DYE: CPT

## 2019-04-04 RX ORDER — SIMVASTATIN 40 MG
40 TABLET ORAL AT BEDTIME
Status: DISCONTINUED | OUTPATIENT
Start: 2019-04-04 | End: 2019-04-05 | Stop reason: HOSPADM

## 2019-04-04 RX ORDER — ACETAMINOPHEN 325 MG/1
650 TABLET ORAL EVERY 4 HOURS PRN
Status: DISCONTINUED | OUTPATIENT
Start: 2019-04-04 | End: 2019-04-05 | Stop reason: HOSPADM

## 2019-04-04 RX ORDER — LEVETIRACETAM 10 MG/ML
1000 INJECTION INTRAVASCULAR EVERY 12 HOURS
Status: DISCONTINUED | OUTPATIENT
Start: 2019-04-04 | End: 2019-04-05

## 2019-04-04 RX ORDER — ONDANSETRON 4 MG/1
4 TABLET, ORALLY DISINTEGRATING ORAL EVERY 6 HOURS PRN
Status: DISCONTINUED | OUTPATIENT
Start: 2019-04-04 | End: 2019-04-05 | Stop reason: HOSPADM

## 2019-04-04 RX ORDER — GADOBUTROL 604.72 MG/ML
10 INJECTION INTRAVENOUS ONCE
Status: COMPLETED | OUTPATIENT
Start: 2019-04-04 | End: 2019-04-04

## 2019-04-04 RX ORDER — SODIUM CHLORIDE, SODIUM LACTATE, POTASSIUM CHLORIDE, CALCIUM CHLORIDE 600; 310; 30; 20 MG/100ML; MG/100ML; MG/100ML; MG/100ML
INJECTION, SOLUTION INTRAVENOUS CONTINUOUS
Status: DISCONTINUED | OUTPATIENT
Start: 2019-04-04 | End: 2019-04-05

## 2019-04-04 RX ORDER — LOVASTATIN 40 MG
80 TABLET ORAL AT BEDTIME
COMMUNITY

## 2019-04-04 RX ORDER — LORAZEPAM 2 MG/ML
2 INJECTION INTRAMUSCULAR
Status: DISCONTINUED | OUTPATIENT
Start: 2019-04-04 | End: 2019-04-05 | Stop reason: HOSPADM

## 2019-04-04 RX ORDER — NALOXONE HYDROCHLORIDE 0.4 MG/ML
.1-.4 INJECTION, SOLUTION INTRAMUSCULAR; INTRAVENOUS; SUBCUTANEOUS
Status: DISCONTINUED | OUTPATIENT
Start: 2019-04-04 | End: 2019-04-05 | Stop reason: HOSPADM

## 2019-04-04 RX ORDER — ONDANSETRON 2 MG/ML
4 INJECTION INTRAMUSCULAR; INTRAVENOUS EVERY 6 HOURS PRN
Status: DISCONTINUED | OUTPATIENT
Start: 2019-04-04 | End: 2019-04-05 | Stop reason: HOSPADM

## 2019-04-04 RX ORDER — ACETAMINOPHEN 650 MG/1
650 SUPPOSITORY RECTAL EVERY 4 HOURS PRN
Status: DISCONTINUED | OUTPATIENT
Start: 2019-04-04 | End: 2019-04-05 | Stop reason: HOSPADM

## 2019-04-04 RX ORDER — LOVASTATIN 20 MG
40 TABLET ORAL AT BEDTIME
Status: DISCONTINUED | OUTPATIENT
Start: 2019-04-04 | End: 2019-04-04 | Stop reason: CLARIF

## 2019-04-04 RX ORDER — ASPIRIN 81 MG/1
81 TABLET ORAL DAILY
Status: DISCONTINUED | OUTPATIENT
Start: 2019-04-04 | End: 2019-04-05 | Stop reason: HOSPADM

## 2019-04-04 RX ADMIN — LEVETIRACETAM 1000 MG: 10 INJECTION INTRAVENOUS at 20:45

## 2019-04-04 RX ADMIN — PANTOPRAZOLE SODIUM 40 MG: 40 INJECTION, POWDER, FOR SOLUTION INTRAVENOUS at 03:34

## 2019-04-04 RX ADMIN — SODIUM CHLORIDE 1000 ML: 9 INJECTION, SOLUTION INTRAVENOUS at 01:43

## 2019-04-04 RX ADMIN — ASPIRIN 81 MG: 81 TABLET, COATED ORAL at 08:33

## 2019-04-04 RX ADMIN — SIMVASTATIN 40 MG: 40 TABLET, FILM COATED ORAL at 20:46

## 2019-04-04 RX ADMIN — SODIUM CHLORIDE, POTASSIUM CHLORIDE, SODIUM LACTATE AND CALCIUM CHLORIDE: 600; 310; 30; 20 INJECTION, SOLUTION INTRAVENOUS at 18:31

## 2019-04-04 RX ADMIN — LEVETIRACETAM 1000 MG: 10 INJECTION INTRAVENOUS at 08:26

## 2019-04-04 RX ADMIN — SODIUM CHLORIDE, POTASSIUM CHLORIDE, SODIUM LACTATE AND CALCIUM CHLORIDE 500 ML: 600; 310; 30; 20 INJECTION, SOLUTION INTRAVENOUS at 16:13

## 2019-04-04 RX ADMIN — SODIUM CHLORIDE, POTASSIUM CHLORIDE, SODIUM LACTATE AND CALCIUM CHLORIDE: 600; 310; 30; 20 INJECTION, SOLUTION INTRAVENOUS at 08:24

## 2019-04-04 RX ADMIN — GADOBUTROL 8 ML: 604.72 INJECTION INTRAVENOUS at 12:10

## 2019-04-04 ASSESSMENT — ACTIVITIES OF DAILY LIVING (ADL)
ADLS_ACUITY_SCORE: 19
ADLS_ACUITY_SCORE: 15
ADLS_ACUITY_SCORE: 15
ADLS_ACUITY_SCORE: 19

## 2019-04-04 ASSESSMENT — ENCOUNTER SYMPTOMS
DIZZINESS: 1
NAUSEA: 1
SEIZURES: 1
VOMITING: 1
BLOOD IN STOOL: 0

## 2019-04-04 ASSESSMENT — MIFFLIN-ST. JEOR: SCORE: 1513.15

## 2019-04-04 NOTE — PROGRESS NOTES
"Clinical Swallow Evaluation:      04/04/19 1031   General Information   Onset Date 04/04/19   Start of Care Date 04/04/19   Referring Physician Dr. Pascual   Patient Profile Review/OT: Additional Occupational Profile Info See Profile for full history and prior level of function   Patient/Family Goals Statement to get better   Swallowing Evaluation Bedside swallow evaluation   Behaviorial Observations (slow responses, decreased awareness but alert)   Mode of current nutrition NPO   Respiratory Status O2 Supply   Type of O2 supply Nasal cannula  (2L)   Comments Pt admitted to [facility] on 4/4/19 with new onset of recurrent seizures and \"reflux disease with coffee-ground emesis  and positive gastric hemoccult.\" He is NPO until SLP eval to assess safety with PO. No prior SLP services per EPIC or pt report.    Clinical Swallow Evaluation   Oral Musculature generally intact   Structural Abnormalities none present   Dentition present and adequate   Mucosal Quality good   Mandibular Strength and Mobility intact   Oral Labial Strength and Mobility WFL   Lingual Strength and Mobility WFL  (swelling, large deep midline fissure)   Velar Elevation intact   Buccal Strength and Mobility intact   Laryngeal Function Cough;Swallow;Throat clear;Voicing initiated;Dry swallow palpated   Additional Documentation Yes   Clinical Swallow Eval: Thin Liquid Texture Trial   Mode of Presentation, Thin Liquids cup;straw;self-fed   Volume of Liquid or Food Presented 4 oz   Oral Phase of Swallow Premature pharyngeal entry   Pharyngeal Phase of Swallow throat clearing   Diagnostic Statement throat clearing on ~25% of trials   Clinical Swallow Eval: Nectar Thick Liquid Texture Trial   Mode of Presentation, Nectar cup;straw;self-fed   Volume of Nectar Presented 4 oz   Oral Phase, Nectar Premature pharyngeal entry   Pharyngeal Phase, [place] intact   Diagnostic Statement no overt signs/sx aspiraiton noted   Clinical Swallow Eval: Puree Solid Texture Trial "   Mode of Presentation, Puree spoon;self-fed   Volume of Puree Presented 3 oz   Oral Phase, Puree WFL   Pharyngeal Phase, Puree intact   Diagnostic Statement no overt signs/sx aspiraiton noted   Clinical Swallow Eval: Semisolid Texture Trial   Mode of Presentation, Semisolid spoon;self-fed   Volume of Semisolid Food Presented ~ 2 oz   Oral Phase, Semisolid WFL   Pharyngeal Phase, Semisolid intact   Diagnostic Statement no overt signs/sx aspiration noted   Clinical Swallow Eval: Solid Food Texture Trial   Mode of Presentation, Solid self-fed   Volume of Solid Food Presented bites   Oral Phase, Solid WFL   Pharyngeal Phase, Solid throat clearing   Diagnostic Statement delayed dry throat clearing   General Therapy Interventions   Planned Therapy Interventions Dysphagia Treatment   Dysphagia treatment Modified diet education;Instruction of safe swallow strategies   Swallow Eval: Clinical Impressions   Skilled Criteria for Therapy Intervention Skilled criteria met.  Treatment indicated.   Functional Assessment Scale (FAS) 5   Treatment Diagnosis mild oropharyngeal dysphagia   Diet texture recommendations Dysphagia diet level 2;Nectar thick liquids   Recommended Feeding/Eating Techniques alternate between small bites and sips of food/liquid;maintain upright posture during/after eating for 30 mins;small sips/bites   Therapy Frequency 5 times/wk   Predicted Duration of Therapy Intervention (days/wks) 1 week   Anticipated Discharge Disposition (anticipate home)   Risks and Benefits of Treatment have been explained. Yes   Patient, family and/or staff in agreement with Plan of Care Yes   Clinical Impression Comments Clinical swallow evaluation completed. Cranial nerves for speech and swallowing appear generally WNL. Tongue appears swollen (more so on left than right) and deep/large fissure on mid-line of tongue also noted. Motor speech is intact. Pt slow to respond, decreased awareness to situation/place however per MD's note  he has been clearing. Pt assessed with thin liquids, nectar thick liquids, puree solids, semi-solids and regular solids and currently presents with mild oropharyngeal dysphagia likely secondary to postictal, recent seizures. With thin liquids, suspect decreased oral control with very loud audible swallows followed by weak/quiet throat clears - improved control and no overt signs/sx with nectar thick liquids. Oral phase functional with all solids, though delayed throat clear with dryer general items. Double swallows also observed with all which may be indicative of decreased pharyngeal clearance. Hyolaryngeal ROM robust and appears coordinated to palpation. Recommend: initiate diet of DD2 solids, nectar thick liquids with 1:1 supervision given slow responses, decreased awareness, impulsivity and recent seizure activity. Only allow PO when fully alert/participatory and upright. Slow pace, small bites/sips, periodic liquid wash.   Total Evaluation Time   Total Evaluation Time (Minutes) 28

## 2019-04-04 NOTE — PLAN OF CARE
Discharge Planner SLP   Patient plan for discharge: wants to return home  Current status: Clinical swallow evaluation completed. Cranial nerves for speech and swallowing appear generally WNL. Tongue appears swollen (more so on left than right) and deep/large fissure on mid-line of tongue also noted. Motor speech is intact. Pt slow to respond, decreased awareness to situation/place however per MD's note he has been clearing.     Pt assessed with thin liquids, nectar thick liquids, puree solids, semi-solids and regular solids and currently presents with mild oropharyngeal dysphagia likely secondary to postictal state, recent seizures. With thin liquids, suspect decreased oral control with very loud audible swallows followed by weak/quiet throat clears - improved control and no overt signs/sx with nectar thick liquids. Oral phase functional with all solids, though delayed throat clear with dryer general items. Double swallows also observed with all which may be indicative of decreased pharyngeal clearance. Hyolaryngeal ROM robust and appears coordinated to palpation. Recommend: initiate diet of DD2 solids, nectar thick liquids with 1:1 supervision given slow responses, decreased awareness, impulsivity and recent seizure activity. Only allow PO when fully alert/participatory and upright. Slow pace, small bites/sips, periodic liquid wash.    Barriers to return to prior living situation: postictal status, medical work up  Recommendations for discharge: anticipate return home  Rationale for recommendations: anticipate short term dysphagia management during IP, anticipate goals will be met/pt able to tolerate baseline diet prior to discharge       Entered by: Cheyenne Canada 04/04/2019 10:38 AM

## 2019-04-04 NOTE — ED PROVIDER NOTES
"  History     Chief Complaint:  Seizures      HPI   Volodymyr Sanchez is a 87 year old male who presents with seizures. Per the wife's report, the patient was lying in bed, breathing heavily and \"shaking\" for about 10 minutes and foaming at the mouth and incontinent. After the convulsions he appeared very tired. The wife reports that a similar episode occurred around 5 PM last night. He woke up yesterday morning feeling normal. He does not have a known seizure history. Upon EMS arrival the patient became dizzy with standing, which has not subsided. Pt had 2 episodes of vomiting dark brown emesis. 8mg Zofran IV given via EMS. The patient is oriented to self. The patient reports that he got sick last night and feels nauseous here. He vomited on the ride to the emergency department. Patient denies a history of stroke and has not had bloody or black stool.     Allergies:  No known drug allergies     Medications:    The patient is not currently taking any prescribed medications.    Past Medical History:    Hypercholesterolemia  Amblyopia     Past Surgical History:    History reviewed. No pertinent surgical history.    Family History:    CAD  stroke    Social History:  Smoking status: Never smoker  Alcohol use: No  Marital Status:   [2]       Review of Systems   Gastrointestinal: Positive for nausea and vomiting. Negative for blood in stool.   Neurological: Positive for dizziness and seizures.   All other systems reviewed and are negative.        Physical Exam     Patient Vitals for the past 24 hrs:   BP Temp Temp src Pulse Heart Rate Resp SpO2   04/04/19 0515 -- -- -- -- -- -- 94 %   04/04/19 0500 105/50 -- -- 74 -- -- 94 %   04/04/19 0445 101/51 -- -- -- -- -- 92 %   04/04/19 0430 -- -- -- -- -- -- 93 %   04/04/19 0415 102/48 -- -- -- -- -- 94 %   04/04/19 0400 132/60 -- -- 72 -- -- 92 %   04/04/19 0350 -- -- -- -- -- -- 97 %   04/04/19 0345 137/54 -- -- -- -- -- 92 %   04/04/19 0340 -- -- -- 72 -- -- 95 % "   04/04/19 0320 -- -- -- 75 -- -- 98 %   04/04/19 0315 136/67 -- -- -- -- -- 97 %   04/04/19 0310 -- -- -- -- -- -- (!) 87 %   04/04/19 0300 133/59 -- -- 78 -- -- 97 %   04/04/19 0250 -- -- -- -- -- -- 97 %   04/04/19 0245 -- -- -- -- -- -- 97 %   04/04/19 0240 -- -- -- -- -- -- 92 %   04/04/19 0230 -- -- -- -- -- -- 95 %   04/04/19 0150 -- -- -- -- -- -- 97 %   04/04/19 0145 125/65 -- -- -- -- -- 96 %   04/04/19 0140 125/65 -- -- 77 -- -- 95 %   04/04/19 0130 136/64 -- -- 77 -- -- 100 %   04/04/19 0120 -- -- -- -- -- -- 96 %   04/04/19 0115 128/61 -- -- -- -- -- 95 %   04/04/19 0113 128/61 98.6  F (37  C) Oral -- 82 16 95 %   04/04/19 0110 -- -- -- 81 -- -- --         Physical Exam  Constitutional:  Oriented to person, place, and time. Well appearing  HENT:   Head:    Normocephalic.   Mouth/Throat:   Oropharynx is clear and moist. No tongue laceration  Eyes:    EOM are normal. Pupils are equal, round, and reactive to light.   Neck:    Neck supple.   Cardiovascular:  Normal rate, regular rhythm and normal heart sounds.      Exam reveals no gallop and no friction rub.       No murmur heard.  Pulmonary/Chest:  Effort normal and breath sounds normal.      No respiratory distress. No wheezes. No rales.      No reproducible chest wall pain.  Abdominal:   Soft. No distension. No tenderness. No rebound and no guarding.   :    Good rectal tone, speck of brown stool, no blood  Musculoskeletal:  Normal range of motion.   Neurological:   Alert and oriented to person, place, and time.           Moves all 4 extremities spontaneously. Cranial nerves intact, GCS 15, Alert and oriented x3  Skin:    No rash noted. No pallor.       Emergency Department Course   ECG (01:09:36):  Rate 81 bpm. ND interval 186. QRS duration 106. QT/QTc 374/434. P-R-T axes 64 -13 21. Sinus rhythm, Normal ECG,  Interpreted at 0109 by Tapan Lundberg MD    Imaging:  Radiographic findings were communicated with the patient who voiced understanding of the  findings    CT head w/o contrast  IMPRESSION: No acute intracranial abnormality  As read by radiology     Laboratory:  ABO/Rh: AB positive  Occult blood gastric: Positive   Alcohol ethyl: <0.01  Troponin I: 0.017  CMP: Glucose 135, Protein total 6.6, (Creatinine 0.95)  CBC: WBC 11.6, HGB 13.1, )    Interventions:  0334: Protonix 40 mg IV Infusion  0333: NaCl bolus 1800 ml IV    Emergency Department Course:  Past medical records, nursing notes, and vitals reviewed.  0120: I performed an exam of the patient and obtained history, as documented above.    IV inserted and blood drawn.    The patient was sent for a CT head while in the emergency department, findings above.    0341:Findings and plan explained to the Patient who consents to admission.     0347: Discussed the patient with Dr. Pascual, who will admit the patient to a [facility] bed for further monitoring, evaluation, and treatment.       Impression & Plan      Medical Decision Making:  Mr. Sanchez is a 87 year old male that came in for seizures times 2 new onset. Differential includes site of old stroke, syncope, epilepsy, alcohol or other causes.workup this far is otherwise non specific and reassuring with new onset seizures second episode I do believe he will need further monitoring and Neurology consult therefore patient will be admitted. Patient has had no further seizure like activity thus far therefore I do not believe he needs anti seizure medication started. He did come in with coffee brown emesis after vomiting which is gastric occult positive. He has brown stool per rectum with no gross blood.I am unsure if this is due to possible bite injury which I do not note on exam or if patient has an upper GI bleed. He is otherwise vitally stable. He has been typed and screened. Protonix has been given for possible GI bleed and this will have to be further monitored     Diagnosis:    ICD-10-CM    1. Seizures (H) R56.9 ABO/Rh type and screen   2. UGIB (upper  gastrointestinal bleed) K92.2        Disposition:  Admitted to med bed    Akin Fung  4/4/2019   [facility] EMERGENCY DEPARTMENT    Scribe Disclosure:  I, Akin Fung, am serving as a scribe at 1:20 AM on 4/4/2019 to document services personally performed by Tapan Beverly CP, MD based on my observations and the provider's statements to me.          Tapan Lundberg MD  04/04/19 0600

## 2019-04-04 NOTE — PROGRESS NOTES
Pt admitted this am by Dr Pascual. Pt seen, admitted with seizure activity new onset. On IV keppra, seizure precautions. MRI brain and EEG pending. Neuro consult. Neuro - CN II-XII wnl, power 5/5 all extremities. Oriented to self only.

## 2019-04-04 NOTE — H&P
Admitted:     04/04/2019      PRIMARY CARE PROVICER:  [facility] in [place]      CHIEF COMPLAINT:  Seizures, multiple.      HISTORY OF PRESENT ILLNESS:  Volodymyr Sanchez is a very pleasant 87-year-old  gentleman, who is actually fairly healthy.  He is on cholesterol medications and takes a laxative for constipation.  Otherwise, he lives in an apartment with his wife.  The patient presented to [facility] via EMS because the patient likely had 2 seizures that we know of.  The first one happened around 5 p.m. yesterday.  The patient laid down in bed to relax when he suddenly started breathing heavy and shaking and having tonic-clonic movements and foaming at the mouth and incontinence.  This lasted about 10 minutes. Not sure what  took place afterwards but apparently, the patient was in bed last night and the wife was asleep and the patient was found to be thrashing again in bed.  When the lights were turned on, the patient was noted once again be in tonic-clonic seizure activity that lasted for about 10 minutes.  EMS was contacted.  The patient was evaluated.  He was clearly postictal.  The patient was orthostatic upon standing.  He had 2 episodes of dark brown emesis.  He received Zofran.  The patient in the Emergency Department was seen by Dr. Tapan Lundberg.  Vital signs showed patient to be afebrile with stable vital signs.  He did have a coffee ground emesis that was gastric occult positive.  Blood work revealed normal electrolytes, normal kidney function, normal LFTs.  CBC showed a white count 11.6, hemoglobin 13.1, platelets 144 with a left shift.  Negative alcohol level.   He had a head CT without contrast which showed no acute intracranial abnormality.  A 12-lead EKG shows normal sinus rhythm without acute ischemic changes.  The patient received a liter of normal saline, IV Protonix, and was loaded with IV Keppra 1000 mg.  The patient is being admitted for new onset of  seizures.      The patient was interviewed by me on the floor.  He is clearing much more mentally.  He tells me that he has very little recollection of the events that brought him here.  He did admit that he bit his tongue a few days ago, but was not sure how or why.  The patient states he has been in stable health otherwise.  Does not use a walker, ambulates independent.  Has not had any trouble with his memory or thinking.  He denies any major cardiopulmonary, GI or  complaints.  Denies any headaches or vision changes or any other episodes of unexplained bowel or bladder incontinence.      PAST MEDICAL HISTORY:   1.  Hypercholesterolemia.   2.  Coronary artery disease.   3.  GERD.   4.  History of basal cell carcinoma.   5.  History of dysplastic nevus.   6.  Amblyopia.      PAST SURGICAL HISTORY:  None.      FAMILY HISTORY:  Father with coronary artery disease and stroke.      SOCIAL HISTORY:  No alcohol, no tobacco.   and lives with his wife.  He is FULL CODE.      ALLERGIES:  NO KNOWN DRUG ALLERGIES:      CURRENT MEDICATION LIST:   1.  Omega-3 fatty acids 1 gram daily.   2.  Cyanocobalamin 5 mcg daily.   3.  Aspirin 81 mg a day.   4.  Omeprazole 10 mg once daily.   5.  Lovastatin 40 mg once day.   6.  Meclizine 25 mg 3 times a day as needed for dizziness.      REVIEW OF SYSTEMS:  Ten-point review of systems reviewed.  Positive for some tongue discomfort from the biting.  Denies any headache or vision changes.  Denies any cardiopulmonary complaints.  Otherwise, as dictated in the history of present illness.      PHYSICAL EXAMINATION:   VITAL SIGNS:  Temperature 98.7, heart rate 74, respiration 18, blood pressure 117/50, sats 95% on room air.   GENERAL:  The patient is an 87-year-old gentleman who is oriented now to place and himself.  He is able to answer all questions.   HEENT:  Pupils are about 2 mm and equal.  Sclerae are anicteric.  Head is atraumatic.  No facial droop.  Mucous membranes are tacky.   He has left-sided tongue trauma.   NECK:  No JVP elevation.  No cervical lymphadenopathy.   PULMONARY:  Lungs are clear to auscultation.   CARDIOVASCULAR:  S1, S2, regular rate and rhythm.   GASTROINTESTINAL:  Abdomen soft.  Normoactive bowel sounds.   MUSCULOSKELETAL:  No edema.   NEUROLOGIC:  He is able to move all 4 extremities.  Cranial nerves grossly intact.   SKIN:  Warm, dry, well perfused.   PSYCHIATRIC:  Mood and affect stable.      LABORATORY DATA:  As dictated in the history of present illness.      ASSESSMENT:  Volodymyr Hawkins is an 87-year-old gentleman who has had likely suspected ongoing repeated seizures over the last few days, at least 2 that were witnessed, suspicion that he has had other unwitnessed seizures, being admitted for further workup and evaluation.      PLAN:   1.  New onset of recurrent seizures:  The patient's initial head CT was negative.  The patient will undergo further evaluation and workup, which includes an MRI, as well as an EEG.  He will undergo formal Neurology consultation.  The patient will be seen by PT, OT and Speech.  We will keep the patient n.p.o. until Speech has cleared him.  The patient will have IV Ativan p.r.n. for any breakthrough seizures.  The patient will be continued on IV Keppra 1000 mg every 12 hours.  He will be on seizure precautions as well.   2.  Reflux disease with coffee-ground emesis and positive gastric Hemoccult:  The patient will receive IV Protonix daily.  This may be from his vomiting episode.   3.  History of hyperlipidemia:  The patient will be continued on Mevacor 80 mg daily.   4.  Carotid artery disease:  The patient will be continued on his aspirin.   5.  Deep venous thrombosis prophylaxis:  The patient will receive compression boots.   6.  Code status:  FULL.         SAVANNA TAI MD             D: 2019   T: 2019   MT: [id number]      Name:     VOLODYMYR HAWKINS   MRN:      [id number]        Account:      [id number]   :       11/13/1931        Admitted:     04/04/2019                   Document: [id number]       cc: [facility]

## 2019-04-04 NOTE — PLAN OF CARE
"Alert, oriented to self, place. VSS, BP soft, denies pain. C/O some nausea, denies intervention and states \"it comes and goes\". Admitted to unit and monitored, paged MD for orders. Placed on continuous O2 monitoring and applied O2 due to decreased O2 sats. Seizure pads in place.  "

## 2019-04-04 NOTE — ED TRIAGE NOTES
Pt presents via EMS from home. Per wife, pt was lying in bed when she noticed pt started breathing heavier and whole body shaking. Wife turned on the lights and noted pt foaming at the mouth and becoming incontinent. Unclear how long pt was out for. Wife also reports that pt had a similar episode around 5pm, and pt woke normally and attended [facility]. Pt has no known seizure history.  Upon EMS arrival pt became dizzy with standing, which has not subsided. Pt had 2 episodes of vomiting dark brown emesis. 8mg Zofran IV given via EMS.  Pt oriented to self. ABCs intact.

## 2019-04-04 NOTE — PLAN OF CARE
PT: Orders received, chart reviewed, eval attempted. Pt BP 88/38 supine with 2 checks supine. No appropriate for PT eval at this time.

## 2019-04-04 NOTE — ED NOTES
Bed: ED12  Expected date: 4/4/19  Expected time: 12:51 AM  Means of arrival: Ambulance  Comments:  [phone]

## 2019-04-04 NOTE — PROVIDER NOTIFICATION
Web based page to hospitalist:   Pt has been on floor for 1 hour, no orders for anything (code status, etc) can you place?

## 2019-04-04 NOTE — PLAN OF CARE
Gary: Dis to time, slow to respond  Neuros intact  VS: BP 98/43   Pulse 74   Temp 98.2  F (36.8  C) (Oral)   Resp 18   Ht 1.829 m (6')   Wt 80 kg (176 lb 6.4 oz)   SpO2 92%   BMI 23.92 kg/m    Tele: SB/SR w/BBB  BP soft, c/o dizziness when up  LS: clear on RA, denies SOB  GI: active, denies nausea  : urinal  Skin: intact, dual RN check w/Sylvia   Activity: x2, GB   Diet: DD2, nectar thick   Pain: denies  Lab: UA sent, please see results. MRI, xray complete.   Plan: EEG today, neuro consult, keppra, PT/OT, speech, IVF  Seizure pads in place.

## 2019-04-04 NOTE — ED NOTES
[facility]  ED Nurse Handoff Report    Volodymyr Sanchez is a 87 year old male   ED Chief complaint: Seizures  . ED Diagnosis:   Final diagnoses:   Seizures (H)   UGIB (upper gastrointestinal bleed)     Allergies: No Known Allergies    Code Status: Full Code  Activity level - Baseline/Home:  Independent. Activity Level - Current:   Stand with Assist. Lift room needed: No. Bariatric: No   Needed: No   Isolation: No. Infection: Not Applicable.     Vital Signs:   Vitals:    04/04/19 0130 04/04/19 0145 04/04/19 0230 04/04/19 0245   BP: 136/64 125/65     Pulse: 77      Resp:       Temp:       TempSrc:       SpO2: 100% 96% 95% 97%       Cardiac Rhythm:  ,      Pain level:    Patient confused: No. Patient Falls Risk: Yes.   Elimination Status: Unable to void   Patient Report - Initial Complaint: seizure. Focused Assessment: same   Tests Performed: lab, scan. Abnormal Results:   Labs Ordered and Resulted from Time of ED Arrival Up to the Time of Departure from the ED   CBC WITH PLATELETS DIFFERENTIAL - Abnormal; Notable for the following components:       Result Value    WBC 11.6 (*)     RBC Count 4.39 (*)     Hemoglobin 13.1 (*)     Platelet Count 144 (*)     Absolute Neutrophil 10.2 (*)     Absolute Lymphocytes 0.7 (*)     All other components within normal limits   COMPREHENSIVE METABOLIC PANEL - Abnormal; Notable for the following components:    Glucose 135 (*)     Protein Total 6.6 (*)     All other components within normal limits   OCCULT BLOOD GASTRIC - Abnormal; Notable for the following components:    Gastric Occult Positive (*)     All other components within normal limits   ALCOHOL ETHYL   TROPONIN I   GLUCOSE MONITOR NURSING POCT   ROUTINE UA WITH MICROSCOPIC   NEURO CHECKS   CARDIAC CONTINUOUS MONITORING   PULSE OXIMETRY NURSING   PERIPHERAL IV CATHETER   ABO/RH TYPE AND SCREEN     .   Treatments provided: below  Family Comments: na  OBS brochure/video discussed/provided to patient:   N/A  ED Medications:   Medications   pantoprazole (PROTONIX) 40 mg IV push injection (not administered)   0.9% sodium chloride BOLUS (1,000 mLs Intravenous New Bag 4/4/19 0143)     Drips infusing:  No  For the majority of the shift, the patient's behavior Green. Interventions performed were n.     Severe Sepsis OR Septic Shock Diagnosis Present: No      ED Nurse Name/Phone Number: Jasonfelecia Anton,   3:25 AM    RECEIVING UNIT ED HANDOFF REVIEW    Above ED Nurse Handoff Report was reviewed: Yes  Reviewed by: Sherri Bloom on April 4, 2019 at 5:10 AM

## 2019-04-04 NOTE — PROGRESS NOTES
Routine EEG ordered by Dr Pascual for eval of seizures.     EEG #[id number] completed.    Funmilayo Menard, RT  4/4/2019 6:05 PM

## 2019-04-04 NOTE — PLAN OF CARE
OT: Order received, chart reviewed, pt admitted after having new onset of recurrent seizures, pt quite lethargic at this time and with low BPs, discussed with RN, will hold OT evaluation this date

## 2019-04-04 NOTE — PHARMACY-ADMISSION MEDICATION HISTORY
Admission medication history interview status for this patient is complete. See [facility] admission navigator for allergy information, prior to admission medications and immunization status.     Medication history interview source(s):Patient and Pharmacy  Medication history resources (including written lists, pill bottles, clinic record): Pharmacy  Primary pharmacy: [facility]     Changes made to PTA medication list:  Added: none  Deleted: none  Changed: lovastatin dose and frequency clarified    Actions taken by pharmacist (provider contacted, etc): Called pharmacy - patient's most recent fill was in December     Additional medication history information:  Patient lives at home and takes care of his own meds but during interview, patient was unable to remember what he takes. No other reliable historian available per patient    Medication reconciliation/reorder completed by provider prior to medication history? No    Do you take OTC medications (eg tylenol, ibuprofen, fish oil, eye/ear drops, etc)? N (Y/N)    Prior to Admission medications    Medication Sig Last Dose Taking? Auth Provider   lovastatin (MEVACOR) 40 MG tablet Take 80 mg by mouth At Bedtime   Yes Unknown, Entered By History     Bob Louie, PharmD IV Student

## 2019-04-05 ENCOUNTER — APPOINTMENT (OUTPATIENT)
Dept: SPEECH THERAPY | Facility: CLINIC | Age: 84
DRG: 101 | End: 2019-04-05
Payer: COMMERCIAL

## 2019-04-05 ENCOUNTER — APPOINTMENT (OUTPATIENT)
Dept: PHYSICAL THERAPY | Facility: CLINIC | Age: 84
DRG: 101 | End: 2019-04-05
Attending: INTERNAL MEDICINE
Payer: COMMERCIAL

## 2019-04-05 VITALS
HEART RATE: 74 BPM | DIASTOLIC BLOOD PRESSURE: 50 MMHG | SYSTOLIC BLOOD PRESSURE: 106 MMHG | WEIGHT: 183.4 LBS | RESPIRATION RATE: 20 BRPM | TEMPERATURE: 96.9 F | OXYGEN SATURATION: 92 % | HEIGHT: 72 IN | BODY MASS INDEX: 24.84 KG/M2

## 2019-04-05 LAB
ANION GAP SERPL CALCULATED.3IONS-SCNC: 4 MMOL/L (ref 3–14)
BUN SERPL-MCNC: 11 MG/DL (ref 7–30)
CALCIUM SERPL-MCNC: 8.1 MG/DL (ref 8.5–10.1)
CHLORIDE SERPL-SCNC: 111 MMOL/L (ref 94–109)
CO2 SERPL-SCNC: 27 MMOL/L (ref 20–32)
CREAT SERPL-MCNC: 0.99 MG/DL (ref 0.66–1.25)
ERYTHROCYTE [DISTWIDTH] IN BLOOD BY AUTOMATED COUNT: 13.9 % (ref 10–15)
GFR SERPL CREATININE-BSD FRML MDRD: 68 ML/MIN/{1.73_M2}
GLUCOSE SERPL-MCNC: 85 MG/DL (ref 70–99)
HCT VFR BLD AUTO: 37.6 % (ref 40–53)
HGB BLD-MCNC: 12 G/DL (ref 13.3–17.7)
MCH RBC QN AUTO: 29.5 PG (ref 26.5–33)
MCHC RBC AUTO-ENTMCNC: 31.9 G/DL (ref 31.5–36.5)
MCV RBC AUTO: 92 FL (ref 78–100)
PLATELET # BLD AUTO: 105 10E9/L (ref 150–450)
POTASSIUM SERPL-SCNC: 4.3 MMOL/L (ref 3.4–5.3)
RBC # BLD AUTO: 4.07 10E12/L (ref 4.4–5.9)
SODIUM SERPL-SCNC: 142 MMOL/L (ref 133–144)
WBC # BLD AUTO: 6.1 10E9/L (ref 4–11)

## 2019-04-05 PROCEDURE — C9113 INJ PANTOPRAZOLE SODIUM, VIA: HCPCS | Performed by: INTERNAL MEDICINE

## 2019-04-05 PROCEDURE — 92526 ORAL FUNCTION THERAPY: CPT | Mod: GN

## 2019-04-05 PROCEDURE — 25000132 ZZH RX MED GY IP 250 OP 250 PS 637: Performed by: INTERNAL MEDICINE

## 2019-04-05 PROCEDURE — 36415 COLL VENOUS BLD VENIPUNCTURE: CPT | Performed by: INTERNAL MEDICINE

## 2019-04-05 PROCEDURE — 85027 COMPLETE CBC AUTOMATED: CPT | Performed by: INTERNAL MEDICINE

## 2019-04-05 PROCEDURE — 97530 THERAPEUTIC ACTIVITIES: CPT | Mod: GP

## 2019-04-05 PROCEDURE — 80048 BASIC METABOLIC PNL TOTAL CA: CPT | Performed by: INTERNAL MEDICINE

## 2019-04-05 PROCEDURE — 25000128 H RX IP 250 OP 636: Performed by: INTERNAL MEDICINE

## 2019-04-05 PROCEDURE — 99239 HOSP IP/OBS DSCHRG MGMT >30: CPT | Performed by: INTERNAL MEDICINE

## 2019-04-05 PROCEDURE — 97161 PT EVAL LOW COMPLEX 20 MIN: CPT | Mod: GP

## 2019-04-05 PROCEDURE — 97116 GAIT TRAINING THERAPY: CPT | Mod: GP

## 2019-04-05 PROCEDURE — 25800030 ZZH RX IP 258 OP 636: Performed by: INTERNAL MEDICINE

## 2019-04-05 RX ORDER — LEVETIRACETAM 500 MG/1
1000 TABLET ORAL 2 TIMES DAILY
Status: DISCONTINUED | OUTPATIENT
Start: 2019-04-05 | End: 2019-04-05 | Stop reason: HOSPADM

## 2019-04-05 RX ORDER — OMEPRAZOLE 20 MG/1
20 TABLET, DELAYED RELEASE ORAL DAILY
Qty: 30 TABLET | Refills: 0 | Status: SHIPPED | OUTPATIENT
Start: 2019-04-05

## 2019-04-05 RX ORDER — LEVETIRACETAM 1000 MG/1
1000 TABLET ORAL 2 TIMES DAILY
Qty: 60 TABLET | Refills: 0 | Status: SHIPPED | OUTPATIENT
Start: 2019-04-05

## 2019-04-05 RX ADMIN — ASPIRIN 81 MG: 81 TABLET, COATED ORAL at 08:16

## 2019-04-05 RX ADMIN — PANTOPRAZOLE SODIUM 40 MG: 40 INJECTION, POWDER, FOR SOLUTION INTRAVENOUS at 08:16

## 2019-04-05 RX ADMIN — LEVETIRACETAM 1000 MG: 10 INJECTION INTRAVENOUS at 08:16

## 2019-04-05 RX ADMIN — SODIUM CHLORIDE, POTASSIUM CHLORIDE, SODIUM LACTATE AND CALCIUM CHLORIDE: 600; 310; 30; 20 INJECTION, SOLUTION INTRAVENOUS at 03:34

## 2019-04-05 ASSESSMENT — MIFFLIN-ST. JEOR: SCORE: 1544.9

## 2019-04-05 ASSESSMENT — ACTIVITIES OF DAILY LIVING (ADL)
ADLS_ACUITY_SCORE: 19
ADLS_ACUITY_SCORE: 20
ADLS_ACUITY_SCORE: 20
ADLS_ACUITY_SCORE: 19
ADLS_ACUITY_SCORE: 19

## 2019-04-05 NOTE — PROGRESS NOTES
"   04/05/19 0845   Quick Adds   Type of Visit Initial PT Evaluation   Living Environment   Lives With spouse   Living Arrangements apartment   Home Accessibility no concerns   Transportation Anticipated car, drives self   Living Environment Comment Patient lives with wife in senior living apartment building.   Self-Care   Usual Activity Tolerance good   Current Activity Tolerance fair   Equipment Currently Used at Home none   Activity/Exercise/Self-Care Comment Patient reports running errands with wife, no issues with mobility at baseline   Functional Level Prior   Ambulation 0-->independent   Transferring 0-->independent   Toileting 0-->independent   Bathing 0-->independent   Communication 0-->understands/communicates without difficulty   Fall history within last six months no   Which of the above functional risks had a recent onset or change? ambulation   Prior Functional Level Comment Patient reports being independent with mobility prior to admission.  Patient's wife has short term memory loss from a \"brain bleed.\"  Wife is unable to drive.   General Information   Onset of Illness/Injury or Date of Surgery - Date 04/04/19   Referring Physician Jose Pascual MD   Patient/Family Goals Statement return to home   Pertinent History of Current Problem (include personal factors and/or comorbidities that impact the POC) Patient with PMH including hypercholesterolemia, CAD, GERD, hx of basal cell carcinoma, hx of dysplastic nevus, amblyopia now admitted with suspected ongoing repeated seizures over the past few days.     Precautions/Limitations fall precautions   Cognitive Status Examination   Orientation orientation to person, place and time   Cognitive Comment Patient with no memories of seizures   Pain Assessment   Patient Currently in Pain No   Integumentary/Edema   Integumentary/Edema no deficits were identifed   Posture    Posture Forward head position;Protracted shoulders   Range of Motion (ROM)   ROM Comment " "WFL   Strength   Strength Comments WFL- requires no assist for transfers   Bed Mobility   Bed Mobility Comments independent   Transfer Skills   Transfer Comments CGA with sit to stand transfers without 2WW, CGA with 2WW   Gait   Gait Comments amb 50 feet with CGA; patient with increased base of support, decreased gait speed, unsteady gait, hands held in low guard, patient reporting increased unsteadiness.  Patient amb 100 feet with 2WW and CGA, patient with much improved gait pattern.  Patient with improved gait speed, normal base of support.     Balance   Balance Comments patient with increased unstreadiness with gait, requiring 2WW   Sensory Examination   Sensory Perception no deficits were identified   Coordination   Coordination no deficits were identified   General Therapy Interventions   Planned Therapy Interventions balance training;gait training;neuromuscular re-education;transfer training;home program guidelines;progressive activity/exercise   Clinical Impression   Criteria for Skilled Therapeutic Intervention yes, treatment indicated   PT Diagnosis decreased independence with mobility   Influenced by the following impairments decreased balance   Functional limitations due to impairments gait, transfers   Clinical Presentation Stable/Uncomplicated   Clinical Presentation Rationale moderately comple PMH, stable presentation, limited social support   Clinical Decision Making (Complexity) Low complexity   Therapy Frequency` daily   Predicted Duration of Therapy Intervention (days/wks) 5 days   Anticipated Equipment Needs at Discharge (may require walker at discharge)   Anticipated Discharge Disposition Home with Home Therapy   Risk & Benefits of therapy have been explained Yes   Patient, Family & other staff in agreement with plan of care Yes   [facility] AM-PAC TM \"6 Clicks\"   2016, Trustees of [facility], under license to [employer].  All rights reserved.   6 Clicks Short Forms Basic Mobility " "Inpatient Short Form   [facility] AM-PAC  \"6 Clicks\" V.2 Basic Mobility Inpatient Short Form   1. Turning from your back to your side while in a flat bed without using bedrails? 4 - None   2. Moving from lying on your back to sitting on the side of a flat bed without using bedrails? 4 - None   3. Moving to and from a bed to a chair (including a wheelchair)? 4 - None   4. Standing up from a chair using your arms (e.g., wheelchair, or bedside chair)? 4 - None   5. To walk in hospital room? 4 - None   6. Climbing 3-5 steps with a railing? 3 - A Little   Basic Mobility Raw Score (Score out of 24.Lower scores equate to lower levels of function) 23   Total Evaluation Time   Total Evaluation Time (Minutes) 5     "

## 2019-04-05 NOTE — PLAN OF CARE
Discharge Planner SLP   Patient plan for discharge: wants to return home  Current status: Swallow tx completed with breakfast meal consisting of DD1/DD2 solids, nectar thick liquids - trials with thin liquids also completed. Pt more alert, aware of place/medical situation. Pt independent with self-feeding. Mastication complete. Improvements in oral control with thin liquids noted. x1 delayed throat clear across ~6 oz thin liquids noted. No other overt signs/sx across meal. Recommend: continue DD2 solids, upgrade to thin liquids. Periodic supervision, only allow PO when fully alert.   Barriers to return to prior living situation: medical work up/status   Recommendations for discharge: anticipate pt will be able to return home, may benefit from home SLP if unable to meet goals/return to baseline diet prior to discharge  Rationale for recommendations: anticipate short term SLP intervention        Entered by: Cheyenne Canada 04/05/2019 9:49 AM

## 2019-04-05 NOTE — CONSULTS
Neurology Consult Note  [facility]       [2019]                                                                                       Admission Date: 2019  Hospital Day: 2      Patient: Volodymyr Sanchez      : 1931  MRN:  [id number]     CC:      Chief Complaint   Patient presents with     Seizures       Consult Request:  Referring Provider:  Jose Pascual MD  Primary Care Provider:  [facility]        HPI:  Volodymyr Sanchez is a 87 year old yo male admitted for 2 generalized tonic clonic seizures at home on 4/3/19. His history is obtained from chart as patient does not remember what happened. His wife reported that he had generalized tonic clonic activity with foaming at mouth and incontinence. His symptoms lasted 10 min. He had another episode during the night with GTC activity. He was postictal when EMS arrived. He was brought to ER and admitted. He was started on Keppra.  There is h/o unexplained tongue biting a few days ago.          A complete review of symptoms was performed including vascular, infectious, cardiovascular, pulmonary, gastrointestinal, endocrinological, hematologic, dermatologic, musculoskeletal, and neurological. All were normal except as above.    PAST MEDICAL HISTORY:  ALLERGIES: No Known Allergies  Tobacco:    History   Smoking Status     Never Smoker   Smokeless Tobacco     Never Used     Alcohol:  Social History    Substance and Sexual Activity      Alcohol use: No    MEDICATIONS:       CURRENTLY SCHEDULED MEDICATIONS     aspirin  81 mg Oral Daily     levETIRAcetam  1,000 mg Oral BID     simvastatin  40 mg Oral At Bedtime     sodium chloride (PF)  3 mL Intracatheter Q8H          HOME MEDICATIONS  Medications Prior to Admission   Medication Sig Dispense Refill Last Dose     lovastatin (MEVACOR) 40 MG tablet Take 80 mg by mouth At Bedtime         MEDICAL HISTORY  Past Medical History:   Diagnosis Date      Hypercholesteremia      SURGICAL HISTORY  History reviewed. No pertinent surgical history.  FAMILY HISTORY    History reviewed. No pertinent family history.  SOCIAL HISTORY  Social History     Socioeconomic History     Marital status:      Spouse name: None     Number of children: None     Years of education: None     Highest education level: None   Occupational History     None   Social Needs     Financial resource strain: None     Food insecurity:     Worry: None     Inability: None     Transportation needs:     Medical: None     Non-medical: None   Tobacco Use     Smoking status: Never Smoker     Smokeless tobacco: Never Used   Substance and Sexual Activity     Alcohol use: No     Drug use: No     Sexual activity: None   Lifestyle     Physical activity:     Days per week: None     Minutes per session: None     Stress: None   Relationships     Social connections:     Talks on phone: None     Gets together: None     Attends [employer] service: None     Active member of club or organization: None     Attends meetings of clubs or organizations: None     Relationship status: None     Intimate partner violence:     Fear of current or ex partner: None     Emotionally abused: None     Physically abused: None     Forced sexual activity: None   Other Topics Concern     None   Social History Narrative     None            Height: 182.9 cm (6')     Temp: 96.4  F (35.8  C)   Weight: 83.2 kg (183 lb 6.4 oz)    Temp src: Oral         BP: (!) 91/39   Heart Rate: 57     Estimated body mass index is 24.87 kg/m  as calculated from the following:    Height as of this encounter: 1.829 m (6').    Weight as of this encounter: 83.2 kg (183 lb 6.4 oz).    Resp: 16   SpO2: 93 %   O2 Device: None (Room air)     Blood Pressure:   BP Readings from Last 3 Encounters:   19 (!) 91/39   10/01/18 148/72     T24 : Temp (24hrs), Av.2  F (35.7  C), Min:95.7  F (35.4  C), Max:96.4  F (35.8  C)       GENERAL EXAMINATION:  HEENT: ANTONY  EOMI.  Neck: Supple. No myofascial tender points.    NEUROLOGICAL EXAMINATION  Mental Status:  Patient is awake, alert, and oriented X3 except the year and date. He could not remember any of the 5 words, He could think of only 6 words starting with F (normal >11).    Cranial Nerves: Pupils are equal and reactive to light.Visual fields are full to confrontation. Extraocular movements are intact. There is no nystagmus in the horizontal or vertical planes.No facial sensory deficits. No facial weakness. The tongue is midline.     Motor: Muscle tone is normal. There is no pronator drift. There is no muscle atrophy. The strength is 5/5 in upper and lower extremities bilaterally. Deep tendon reflexes are 2+ and symmetric in his biceps, triceps, knees, and ankles. Plantars are flexor.    Sensory: Patient has normal pin prick and light touch sensation in the upper and lower extremities.     Coordination: .Finger to nose - normal.    Gait: not evaluated    .  LABORATORY RESULTS      Recent Labs   Lab 04/05/19  0632 04/04/19  0216   WBC 6.1 11.6*   HGB 12.0* 13.1*   HCT 37.6* 40.3   MCV 92 92   * 144*     Recent Labs   Lab 04/05/19  0632 04/04/19  0216    138   POTASSIUM 4.3 4.1   CHLORIDE 111* 108   CO2 27 26   ANIONGAP 4 4   GLC 85 135*   BUN 11 17   CR 0.99 0.95   GFRESTIMATED 68 72   GFRESTBLACK 79 83   XIOMY 8.1* 8.5   PROTTOTAL  --  6.6*   ALBUMIN  --  3.5   BILITOTAL  --  0.7   ALKPHOS  --  66   AST  --  42   ALT  --  19     No results for input(s): TSH in the last 168 hours.  Recent Labs   Lab 04/04/19  1415   COLOR Yellow   APPEARANCE Clear   URINEGLC 70*   URINEBILI Negative   URINEKETONE Trace*   SG 1.028   UBLD Negative   URINEPH 5.5   PROTEIN 20*   NITRITE Negative   LEUKEST Negative   RBCU 2   WBCU 1       IMAGING RESULTS     MRI BRAIN WITHOUT AND WITH CONTRAST  4/4/2019 12:20 PM     HISTORY:  Seizure, new, abnormal neuro exam, nontraumatic.      TECHNIQUE:  Multiplanar, multisequence MRI of the brain  without and  with 8 mL Gadavist.     COMPARISON: Head CT 4/4/2019 and head MRI 6/1/2009.     FINDINGS:  Mild volume loss is present. Frontoparietal predominant  periventricular and subcortical T2 FLAIR hyperintensities likely  represent chronic small vessel ischemic change. 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 Lacunar infarct is  present within the right inferior putamen. Small bilateral cerebellar  infarcts are present. The right cerebellar and basal ganglia/left  frontal lacunar infarcts are new compared to the prior exam. No  evidence of recent ischemia, hemorrhage, mass, mass effect, or  hydrocephalus. No abnormal enhancement or diffusion restriction is  identified. Susceptibility weighted imaging is unremarkable. Major  intracranial vascular flow voids are maintained. The visualized  calvarium, skull base, paranasal sinuses, and extracranial soft  tissues are unremarkable.                                                                      IMPRESSION:  Volume loss, chronic small vessel ischemic change,  multiple old small/lacunar infarcts progressed compared to 2009.  _____________________________________________________________________________      _____________________________________________________________________________          ASSESSMENT     1. Generalized tonic clonic seizure- EEG- generalized slowing, ? Postictal vs neurodegenerative state  2. Probable Dementia- needs evaluation as outpatient        RECOMMENDATIONS   1. Continue keppra  2. OK to discharge.  3. F/u in 3-4 weeks

## 2019-04-05 NOTE — PROGRESS NOTES
Seen by therapy & they suggest C visits- SW aware & discussed with pt/spouse but he refuses. Neuro feels he   Should F/U for further cognitive screening as out pt.as he   Is quite forgetful. Spouse & grandson here & pt requests discharge home.Does not recall any seizure activity& none  Witnessed today- he feels at baseline.

## 2019-04-05 NOTE — PROCEDURES
Procedure Date: 2019      ELECTROENCEPHALOGRAM       EEG #[id number]      SUMMARY OF FINDINGS:  This is an 18-channel routine portable EEG with one channel of EKG.  The EEG opens up with diffuse background slowing in the 7-8 Hz range with intermittent slowing in the delta/low theta range.  There is no abnormal epileptiform activity noted during this recording.  Hyperventilation and photic stimulation were unremarkable.  The EKG showed normal sinus rhythm of 60 beats per minute.      IMPRESSION:  This is slightly abnormal electroencephalographic study showing diffuse background slowing which may be seen with mild encephalopathy versus neurodegenerative state.  The possibility of interictal state may also be considered.         KERMIT KAY MD             D: 2019   T: 2019   MT: DANIELITO      Name:     GALLITO HAWKINS   MRN:      [id number]        Account:        [id number]   :      1931           Procedure Date: 2019      Document: [id number]

## 2019-04-05 NOTE — PLAN OF CARE
Presentation/Diagnosis: Sezisure  History: CAD, hypercholesterolemia, GERD  Labs/Protocols: Neuro checks q4h, sz precautions   Vitals: BP soft 82/31 but asymptomatic. MD text paged orderd 500cc bolus LR. Recheck /55 & 107/48.   Cardiac: WDL  Telemetry: SB rates 55-60 bpm   Respiratory: LS clear/equal  Neuro: Neuros intact ex AOx3, disoriented to situation. Slow to respond and lethargic.   GI/: Last BM 4/2/2019. Voiding ok. Inc at times. Bowel sounds active in all 4 quads.   Skin: Bruised, blanchable redness on coccyx, educated on freq position changes for pressure relief.  LDAs: IVF 100ml/hr, IV keppa started.  Diet: Pt did not want/eat evening meal. On DD2 diet with nectar thick liquid.  Activity: Ax1 with gait belt, alarms on.   Teaching: Education per plan of care.   Plan: EEG done today, continue neuro checks, PT/OT/Speech/Neuro following. Will continue to monitor.

## 2019-04-05 NOTE — DISCHARGE SUMMARY
[facility]  Hospitalist Discharge Summary       Date of Admission:  4/4/2019  Date of Discharge:  4/5/2019  Discharging Provider: Marko Kaminski,       Discharge Diagnoses   1.  Seizure.  Was seen in consultation by neurology.  Started on Keppra during hospital stay.  Does need to continue on Keppra 1000 mg twice a day.  It was discussed with the patient and family members at bedside that he is not to drive until he follows up with neurology.  He is unhappy with this.  However, he does state an understanding of the reasons why he is not to drive until he has follow-up for his seizures.  Does need to follow-up with neurology within 3-4 weeks.    2.  Hematemesis.  Did have one episode of emesis after his seizure episode.  This was checked for occult blood.  Was found to have occult blood.  Patient has not noted any dark or tarry stools.  Has never thrown up blood before that he knows of.  Started on IV proton pump inhibitor during hospital stay.  Will now start omeprazole 20 mg a day.  If he were to have any evidence of bleeding despite initiation of omeprazole, he would then need to be evaluated by gastroenterology.    3.  Hyperlipidemia.  Restart statin.    4.  Dysphagia.  Appreciate speech pathology input.  Continue dysphagia diet.  Follow-up with speech pathology in the outpatient setting.    5.  Suspected dementia.  Follow-up with occupational therapy in the outpatient setting for formal cognitive evaluation.    Follow-ups Needed After Discharge   Follow-up Appointments     Follow-up and recommended labs and tests       Follow up with primary care provider, [facility],   within 7 days for hospital follow- up.  The following labs/tests are   recommended: BMP in 7 days.  Follow up with Neurology in 3-4 weeks.             Discharge Disposition   Discharged to home  Condition at discharge: Stable    Hospital Course   Volodymyr Sanchez is a very pleasant 87-year-old   gentleman, who is actually fairly healthy.  He is on cholesterol medications and takes a laxative for constipation.  Otherwise, he lives in an apartment with his wife.  The patient presented to [facility] via EMS because the patient likely had 2 seizures that we know of.  The first one happened around 5 p.m. yesterday.  The patient laid down in bed to relax when he suddenly started breathing heavy and shaking and having tonic-clonic movements and foaming at the mouth and incontinence.  This lasted about 10 minutes. Not sure what  took place afterwards but apparently, the patient was in bed last night and the wife was asleep and the patient was found to be thrashing again in bed.  When the lights were turned on, the patient was noted once again be in tonic-clonic seizure activity that lasted for about 10 minutes.  EMS was contacted.  The patient was evaluated.  He was clearly postictal.  The patient was orthostatic upon standing.  He had 2 episodes of dark brown emesis.  He received Zofran.  The patient in the Emergency Department was seen by Dr. Tapan Lundberg.  Vital signs showed patient to be afebrile with stable vital signs.  He did have a coffee ground emesis that was gastric occult positive.  Blood work revealed normal electrolytes, normal kidney function, normal LFTs.  CBC showed a white count 11.6, hemoglobin 13.1, platelets 144 with a left shift.  Negative alcohol level.   He had a head CT without contrast which showed no acute intracranial abnormality.  A 12-lead EKG shows normal sinus rhythm without acute ischemic changes.  The patient received a liter of normal saline, IV Protonix, and was loaded with IV Keppra 1000 mg.  The patient is being admitted for new onset of seizures.         Consultations This Hospital Stay   NEUROLOGY IP CONSULT  PHYSICAL THERAPY ADULT IP CONSULT  OCCUPATIONAL THERAPY ADULT IP CONSULT  SPEECH LANGUAGE PATH ADULT IP CONSULT  SOCIAL WORK IP CONSULT    Code Status    Full Code    Time Spent on this Encounter   I spent 40 minutes with Mr. Sanchez and working on discharge on 4/5/19.       Marko Kaminski, DO  [facility]  ______________________________________________________________________    Physical Exam   Vital Signs: Temp: 96.9  F (36.1  C) Temp src: Oral BP: 106/50   Heart Rate: 68 Resp: 20 SpO2: 92 % O2 Device: None (Room air)    Weight: 183 lbs 6.4 oz  Gen:  NAD, A&Ox2 to person and place.  Has trouble with time.  Eyes:  PERRL, sclera anicteric.  OP:  MMM, no lesions.  Neck:  Supple.  CV:  Regular, no murmurs.  Lung:  CTA b/l, normal effort.  Ab:  +BS, soft.  Skin:  Warm, dry to touch.  No rash.  Ext:  No pitting edema LE b/l.         Primary Care Physician   [facility]    Immunizations       Discharge Orders      Occupational Therapy Referral      Speech Therapy Referral      Follow-up and recommended labs and tests     Follow up with primary care provider, [facility], within 7 days for hospital follow- up.  The following labs/tests are recommended: BMP in 7 days.  Follow up with Neurology in 3-4 weeks.     Activity    Your activity upon discharge: activity as tolerated     Diet    Follow this diet upon discharge:       Combination Diet Dysphagia Diet Level 2: Mechan Altered; Thin Liquids (water, ice chips, juice, milk gelatin, ice cream, etc)           Discharge Medications   Current Discharge Medication List      START taking these medications    Details   levETIRAcetam (KEPPRA) 1000 MG tablet Take 1 tablet (1,000 mg) by mouth 2 times daily  Qty: 60 tablet, Refills: 0    Associated Diagnoses: Seizures (H)      omeprazole (PRILOSEC OTC) 20 MG EC tablet Take 1 tablet (20 mg) by mouth daily  Qty: 30 tablet, Refills: 0    Associated Diagnoses: UGIB (upper gastrointestinal bleed)         CONTINUE these medications which have NOT CHANGED    Details   lovastatin (MEVACOR) 40 MG tablet Take 80 mg by mouth At Bedtime             Allergies   No Known Allergies

## 2019-04-05 NOTE — PLAN OF CARE
PT: Patient seen by physical therapy for evaluation and treatment.  Patient with PMH including hypercholesterolemia, CAD, GERD, hx of basal cell carcinoma, hx of dysplastic nevus, amblyopia now admitted with suspected ongoing repeated seizures over the past few days.  Patient lives with wife in senior apartment.  Patient reports being independent with mobility at baseline.  Patient's wife has short term memory deficits, patient does all driving and provides supervision for his wife.      Discharge Planner PT   Patient plan for discharge: home  Current status: Patient supine upon arrival, has no memory of seizures or why he is in hospital.  Patient independent with bed mobility.  Patient transferred to sitting at EOB independently, no reports of dizziness, /43.  Patient transferred to standing with CGA; again, no reports of dizziness, /41.  Patient amb 50 feet with CGA; patient with increased base of support, decreased gait speed, unsteady gait, hands held in low guard, patient reporting increased unsteadiness.  Patient amb 100 feet with 2WW and CGA, patient with much improved gait pattern.  Patient with improved gait speed, normal base of support.  Recommend use of walker with Ax1 for all mobility with nursing.     Barriers to return to prior living situation: lives with wife who has memory deficits, decreased balance, fall risk  Recommendations for discharge: home with home RN, PT, OT, HHA and use of walker with all mobility  Rationale for recommendations: Patient presents below baseline level of functional mobility secondary to decreased balance.  Anticipate that patient will be able to return to home with increased level of support (Home care, use of walker) with ongoing skilled physical therapy in order to increase safety and independence with mobility.       Entered by: Leidy Gooden 04/05/2019 8:56 AM

## 2019-04-05 NOTE — PLAN OF CARE
OT: attempted at set time and pt with MD, returned and pt had received breakfast and was going to work with speech

## 2019-04-05 NOTE — PLAN OF CARE
A/O to self and place, only. Up ast x 1 with gait belt.   Tele: Sinus Javy HR 51.   LR @ 100ml/hr.   DD2 diet with nectar thick liquids. Must have a 1:1 while eating for safety.   Q4 neuros  - intact.

## 2019-04-05 NOTE — CONSULTS
Care Transition Initial Assessment - SW     Met with: Patient and Family, pt's wife Leta and their grandson were present.  Active Problems:    Seizures (H)       DATA  Lives With: spouse - Leta  Living Arrangements: apartment - [facility] [address]  Quality of Family Relationships: helpful, involved, supportive  Description of Support System: Supportive, Involved  Who is your support system?: Wife, Children(Grandchildren) - 2 dtrs live local.  Support Assessment: Adequate family and caregiver support, Adequate social supports. Pt states he has a strong support system in his children and grandchildren.  Identified issues/concerns regarding health management: Pt was admitted for seizures.    Resources List: Home Care (Pt declined)     Quality of Family Relationships: helpful, involved, supportive  Transportation Anticipated: family or friend will provide - Pt's grandson will drive the pt home.    ASSESSMENT  Cognitive Status:  awake, alert and oriented  Concerns to be addressed: Pt states that he is independent at home and still drives.  He says that they have access to walkers and wheelchairs at their apartment if needed.  Sw gave the pt a list of HC agencies, so that he could determine which one he wanted to sign on with.  Pt does not think that he needs any help at home.  Pt asked what would happen if he went home and changed his mind about HC services.  Sw informed him that he will need to follow-up with his primary physician.  Pt said he will follow-up as needed.       PLAN  Sw will continue to be available as needed until discharge.  Pt expressed no other sw needs at this time.

## 2019-04-05 NOTE — PROGRESS NOTES
Pt discharged to home at 1640.  All pt belongings with pt.  All discharge instructions reviewed with pt and his family, questions answered and pt/family verbalize understanding. Prescriptions sent with pt, as well as medication information.

## 2019-04-06 NOTE — PLAN OF CARE
Speech Language Therapy Discharge Summary    Reason for therapy discharge:    Discharged to transitional care facility.    Progress towards therapy goal(s). See goals on Care Plan in [facility] electronic health record for goal details.  Goals partially met.  Barriers to achieving goals:   discharge from facility.    Therapy recommendation(s):    Continued therapy is recommended.  Rationale/Recommendations:  Continue.DD2 solids, upgrade to thin liquids. Periodic supervision, only allow PO when fully alert.  Below baseline for diet and needs reinforcement for safe swallowing strategies.

## 2019-04-06 NOTE — PLAN OF CARE
Physical Therapy Discharge Summary    Reason for therapy discharge:    Discharged to home with home therapy.    Progress towards therapy goal(s). See goals on Care Plan in [facility] electronic health record for goal details.  Goals not met.  Barriers to achieving goals:   discharge from facility.    Therapy recommendation(s):    Continued therapy is recommended.  Rationale/Recommendations: Patient would benefit from continued skilled therapy to further improve strength, balance, and independence with mobility and ambulation to address functional limitations and decrease falls risk.